# Patient Record
Sex: FEMALE | Race: WHITE | NOT HISPANIC OR LATINO | URBAN - METROPOLITAN AREA
[De-identification: names, ages, dates, MRNs, and addresses within clinical notes are randomized per-mention and may not be internally consistent; named-entity substitution may affect disease eponyms.]

---

## 2018-05-22 ENCOUNTER — APPOINTMENT (OUTPATIENT)
Dept: MRI IMAGING | Facility: CLINIC | Age: 48
End: 2018-05-22
Attending: EMERGENCY MEDICINE
Payer: COMMERCIAL

## 2018-05-22 ENCOUNTER — HOSPITAL ENCOUNTER (EMERGENCY)
Facility: CLINIC | Age: 48
Discharge: HOME | End: 2018-05-22
Attending: EMERGENCY MEDICINE | Admitting: EMERGENCY MEDICINE
Payer: COMMERCIAL

## 2018-05-22 VITALS
RESPIRATION RATE: 18 BRPM | TEMPERATURE: 98.2 F | SYSTOLIC BLOOD PRESSURE: 119 MMHG | DIASTOLIC BLOOD PRESSURE: 74 MMHG | HEIGHT: 62 IN | BODY MASS INDEX: 29.44 KG/M2 | WEIGHT: 160 LBS | OXYGEN SATURATION: 99 %

## 2018-05-22 DIAGNOSIS — M54.16 LUMBAR RADICULOPATHY: ICD-10-CM

## 2018-05-22 LAB — RADIOLOGIST FLAGS: NORMAL

## 2018-05-22 PROCEDURE — 25000132 ZZH RX MED GY IP 250 OP 250 PS 637: Performed by: EMERGENCY MEDICINE

## 2018-05-22 PROCEDURE — 99284 EMERGENCY DEPT VISIT MOD MDM: CPT | Mod: 25 | Performed by: EMERGENCY MEDICINE

## 2018-05-22 PROCEDURE — 72148 MRI LUMBAR SPINE W/O DYE: CPT

## 2018-05-22 PROCEDURE — 99284 EMERGENCY DEPT VISIT MOD MDM: CPT | Mod: Z6 | Performed by: EMERGENCY MEDICINE

## 2018-05-22 RX ORDER — ACETAMINOPHEN 500 MG
1000 TABLET ORAL ONCE
Status: COMPLETED | OUTPATIENT
Start: 2018-05-22 | End: 2018-05-22

## 2018-05-22 RX ORDER — KETOROLAC TROMETHAMINE 10 MG/1
10 TABLET, FILM COATED ORAL ONCE
Status: COMPLETED | OUTPATIENT
Start: 2018-05-22 | End: 2018-05-22

## 2018-05-22 RX ORDER — METHYLPREDNISOLONE 4 MG
TABLET, DOSE PACK ORAL
Qty: 21 TABLET | Refills: 0 | Status: SHIPPED | OUTPATIENT
Start: 2018-05-22 | End: 2018-05-31

## 2018-05-22 RX ORDER — KETOROLAC TROMETHAMINE 10 MG/1
10 TABLET, FILM COATED ORAL EVERY 6 HOURS PRN
Qty: 20 TABLET | Refills: 0 | Status: SHIPPED | OUTPATIENT
Start: 2018-05-22 | End: 2018-10-31

## 2018-05-22 RX ADMIN — KETOROLAC TROMETHAMINE 10 MG: 10 TABLET, FILM COATED ORAL at 07:33

## 2018-05-22 RX ADMIN — ACETAMINOPHEN 1000 MG: 500 TABLET, FILM COATED ORAL at 10:41

## 2018-05-22 ASSESSMENT — ENCOUNTER SYMPTOMS
ABDOMINAL PAIN: 0
BRUISES/BLEEDS EASILY: 0
FEVER: 0
WEAKNESS: 0
BACK PAIN: 1
NUMBNESS: 0
DIFFICULTY URINATING: 0
SHORTNESS OF BREATH: 0

## 2018-05-22 NOTE — ED AVS SNAPSHOT
[facility], [place], Emergency Department    [address] [place] [address]    Phone:  [phone]                                       Chayo Chang   MRN: [id number]    Department:  [facility], Emergency Department   Date of Visit:  5/22/2018           After Visit Summary Signature Page     I have received my discharge instructions, and my questions have been answered. I have discussed any challenges I see with this plan with the nurse or doctor.    ..........................................................................................................................................  Patient/Patient Representative Signature      ..........................................................................................................................................  Patient Representative Print Name and Relationship to Patient    ..................................................               ................................................  Date                                            Time    ..........................................................................................................................................  Reviewed by Signature/Title    ...................................................              ..............................................  Date                                                            Time

## 2018-05-22 NOTE — DISCHARGE INSTRUCTIONS
Thank you for coming to the [facility] Emergency Department.     Please follow up with primary care this week to arrange further testing of adnexal mass.     For back pain, use medrol dose pack until the entire pack is completed. Follow the instructions on the packaging.   Use Tylenol or Toradol as needed for pain every 6 hours.     Return to the ER for:  New weakness in the left leg  Inability to pass urine, or new incontinence of urine or stool

## 2018-05-22 NOTE — ED PROVIDER NOTES
History     Chief Complaint   Patient presents with     Back Pain     Leg Pain     HPI  Chayo Chang is a 48-year-old female with a history of epilepsy and migraine headaches who presents with back pain. She had the onset of symptoms on Sunday, 2 days ago, but they were mild. It is associated with radiation into the top of the left leg, down to the left knee. Over the course of the day yesterday and last night, the pain has become more significant and moved up into the low back. It is located primarily in the left side, near the SI joint. She has a position of comfort with sitting, leaning forward. No numbness or tingling. No bowel or bladder symptoms. She attempted relief of symptoms with ibuprofen without improvement. She is not anticoagulated and has no history of cancer. No falls or other trauma.     This part of the document was transcribed by Cornel Dial for Aziza Severino MD.      Past Medical History:   Diagnosis Date     Seizures (H)        Past Surgical History:   Procedure Laterality Date     RELEASE DEQUERVAINS WRIST Left 8/11/2016    Procedure: RELEASE DEQUERVAINS WRIST;  Surgeon: Deisi Lewis MD;  Location: [facility] OR     SKIN INCISION      nodules removed from scalp in childhood      VASCULAR SURGERY       wisdom teeth       4 removed        FH:   No hx of ovarian malignancies    Social History   Substance Use Topics     Smoking status: Never Smoker     Smokeless tobacco: Never Used     Alcohol use 4.2 oz/week     7 Glasses of wine per week      Comment: occas       No current facility-administered medications for this encounter.      Current Outpatient Prescriptions   Medication     acetaminophen (TYLENOL) 325 MG tablet     carBAMazepine (TEGRETOL XR) 400 MG 12 hr tablet     ibuprofen (IBUPROFEN) 200 MG tablet     ketorolac (TORADOL) 10 MG tablet     methylPREDNISolone (MEDROL DOSEPAK) 4 MG tablet     PHENobarbital (LUMINAL) 30 MG tablet     propranolol (INDERAL LA) 80 MG capsule      "SUMAtriptan (IMITREX) 100 MG tablet     Past Medical History:   Diagnosis Date     Seizures (H)        Past Surgical History:   Procedure Laterality Date     RELEASE DEQUERVAINS WRIST Left 8/11/2016    Procedure: RELEASE DEQUERVAINS WRIST;  Surgeon: Deisi Lewis MD;  Location: [facility] OR     SKIN INCISION      nodules removed from scalp in childhood      VASCULAR SURGERY       wisdom teeth       4 removed        No family history on file.    Social History   Substance Use Topics     Smoking status: Never Smoker     Smokeless tobacco: Never Used     Alcohol use 4.2 oz/week     7 Glasses of wine per week      Comment: occas     Allergies   Allergen Reactions     Ampicillin      Codeine          I have reviewed the Medications, Allergies, Past Medical and Surgical History, and Social History in the Epic system.    Review of Systems   Constitutional: Negative for fever.   Respiratory: Negative for shortness of breath.    Cardiovascular: Negative for chest pain.   Gastrointestinal: Negative for abdominal pain.   Genitourinary: Negative for difficulty urinating.   Musculoskeletal: Positive for back pain.   Neurological: Negative for weakness and numbness.   Hematological: Does not bruise/bleed easily.   All other systems reviewed and are negative.      Physical Exam   BP: 127/74 (Simultaneous filing. User may not have seen previous data.)  Heart Rate: 61  Temp: 98.2  F (36.8  C)  Resp: 18  Height: 157.5 cm (5' 2\")  Weight: 72.6 kg (160 lb)  SpO2: 97 %      Physical Exam  Gen:A&Ox3, no acute distress  HEENT: head atraumatic  Neck:no bony tenderness or step offs, no JVD, trachea midline  Back: no CVA tenderness, no midline bony tenderness, left SI and gluteal tenderness without skin changes.   CV:RRR without murmurs  PULM:Clear to auscultation bilaterally  Abd:soft, nontender, nondistended. Bowel sounds present and normal  UE:No traumatic injuries, skin normal  LE:no traumatic injuries, skin normal, no LE " edema  Neuro:CN II-XII intact, strength 5/5 of flexion and extension of the toes, ankles, knees, hips, hands, wrists, elbows and shoulders.  Sensation intact to touch throughout. Coordination normal on finger to nose testing. Gait limping on left.   Skin: no rashes or ecchymoses        ED Course     ED Course     Procedures          Critical Care time:  none    Assessments & Plan (with Medical Decision Making)   48-year-old female presenting with low back pain. Located primarily in the lateral SI area. Differential includes lumbar radiculopathy, piriformis syndrome, herniated disc. Treated with oral Toradol for pain, warm packs.    This part of the document was transcribed by Cornel Dial for Aziza Severino MD.    Patient was reassessed and did have improvement in pain with Toradol and was able to sleep 45 minutes.  She tolerated MRI without difficulty.  MRI was performed and shows an L3 left disc disc bulge    Patient was reassessed and had improvement in pain Toradol.  She was able to sleep 45 minutes.  She tolerated MRI without difficulty.  MRI was performed and shows a left L3 extraforaminal disc bulge consistent with her area of radicular symptoms.  May plan for her to try a Medrol Dosepak as well as Toradol as needed for pain.  Will have a follow-up with primary care for arranging outpatient steroid injection if not improving.    MRI also incidentally noted a left adnexal septated mass.  She is premenopausal.  Recommended follow-up is ultrasound.  The patient verbalized understanding of this finding and will contact her primary care doctor to arrange outpatient ultrasound.  This is currently asymptomatic.    I have reviewed the nursing notes.    I have reviewed the findings, diagnosis, plan and need for follow up with the patient.    New Prescriptions    KETOROLAC (TORADOL) 10 MG TABLET    Take 1 tablet (10 mg) by mouth every 6 hours as needed for moderate pain    METHYLPREDNISOLONE (MEDROL DOSEPAK) 4 MG TABLET     Follow package instructions       Final diagnoses:   Lumbar radiculopathy       5/22/2018   [facility], [place], EMERGENCY DEPARTMENT    MD NOEMÍ Sloan Katrina Anne, MD  05/22/18 2613

## 2018-05-22 NOTE — ED AVS SNAPSHOT
[facility], Emergency Department    [address] [facility] [address]    Phone:  [phone]                                       Chayo Chang   MRN: [id number]    Department:  [facility], Emergency Department   Date of Visit:  5/22/2018           Patient Information     Date Of Birth          1970        Your diagnoses for this visit were:     Lumbar radiculopathy        You were seen by Aziza Severino MD.        Discharge Instructions       Thank you for coming to the [facility] Emergency Department.     Please follow up with primary care this week to arrange further testing of adnexal mass.     For back pain, use medrol dose pack until the entire pack is completed. Follow the instructions on the packaging.   Use Tylenol or Toradol as needed for pain every 6 hours.     Return to the ER for:  New weakness in the left leg  Inability to pass urine, or new incontinence of urine or stool    24 Hour Appointment Hotline       To make an appointment at any [facility] clinic, call [phone] ([phone]). If you don't have a family doctor or clinic, we will help you find one. [facility] clinics are conveniently located to serve the needs of you and your family.             Review of your medicines      START taking        Dose / Directions Last dose taken    ketorolac 10 MG tablet   Commonly known as:  TORADOL   Dose:  10 mg   Quantity:  20 tablet        Take 1 tablet (10 mg) by mouth every 6 hours as needed for moderate pain   Refills:  0        methylPREDNISolone 4 MG tablet   Commonly known as:  MEDROL DOSEPAK   Quantity:  21 tablet        Follow package instructions   Refills:  0          Our records show that you are taking the medicines listed below. If these are incorrect, please call your family doctor or clinic.        Dose / Directions Last dose taken    ibuprofen 200 MG tablet   Commonly known as:  ibuprofen   Dose:  200 mg   Quantity:  100 tablet         Take 1 tablet (200 mg) by mouth every 4 hours as needed   Refills:  0        IMITREX 100 MG tablet   Generic drug:  SUMAtriptan        Take by mouth at onset of headache   Refills:  0        INDERAL LA 80 MG 24 hr capsule   Generic drug:  propranolol        Take by mouth At Bedtime   Refills:  0        PHENobarbital 30 MG tablet   Commonly known as:  LUMINAL   Dose:  30 mg        Take 30 mg by mouth 2 times daily   Refills:  0        TEGRETOL  MG 12 hr tablet   Dose:  400 mg   Generic drug:  carBAMazepine        Take 400 mg by mouth 2 times daily   Refills:  0        TYLENOL 325 MG tablet   Dose:  325-650 mg   Generic drug:  acetaminophen        Take 325-650 mg by mouth every 6 hours as needed   Refills:  0                Prescriptions were sent or printed at these locations (2 Prescriptions)                   Other Prescriptions                Printed at Department/Unit printer (2 of 2)         ketorolac (TORADOL) 10 MG tablet               methylPREDNISolone (MEDROL DOSEPAK) 4 MG tablet                Procedures and tests performed during your visit     Lumbar spine MRI w/o contrast      Orders Needing Specimen Collection     None      Pending Results     No orders found from 5/20/2018 to 5/23/2018.            Pending Culture Results     No orders found from 5/20/2018 to 5/23/2018.            Pending Results Instructions     If you had any lab results that were not finalized at the time of your Discharge, you can call the ED Lab Result RN at [phone]. You will be contacted by this team for any positive Lab results or changes in treatment. The nurses are available 7 days a week from 10A to 6:30P.  You can leave a message 24 hours per day and they will return your call.        Thank you for choosing [facility]       Thank you for choosing [facility] for your care. Our goal is always to provide you with excellent care. Hearing back from our patients is one way we can continue to improve our services. Please  take a few minutes to complete the written survey that you may receive in the mail after you visit with us. Thank you!        Care EveryWhere ID     This is your Care EveryWhere ID. This could be used by other organizations to access your [facility] medical records  [id number]        Equal Access to Services     LARRY RUFFIN : Moo Gibson, andres gurrola, pushpa mcdanielsaltariq barclay, loyd aguilar. So [facility] [phone].    ATENCIÓN: Si habla español, tiene a shoemaker disposición servicios gratuitos de asistencia lingüística. Llame al [phone].    We comply with applicable federal civil rights laws and [place] laws. We do not discriminate on the basis of race, color, national origin, age, disability, sex, sexual orientation, or gender identity.            After Visit Summary       This is your record. Keep this with you and show to your community pharmacist(s) and doctor(s) at your next visit.

## 2018-05-22 NOTE — ED TRIAGE NOTES
Pt ambulatory to triage c/o of L back and L leg pain. Pain started 2 days ago around 1800 and has progressively gotten worse. She has been taking ibuprofen q6hr for the pain. Other than frequent house cleaning, pt has not done any unusual activities.

## 2018-05-24 ENCOUNTER — TELEPHONE (OUTPATIENT)
Dept: ANESTHESIOLOGY | Facility: CLINIC | Age: 48
End: 2018-05-24

## 2018-05-24 ENCOUNTER — RADIANT APPOINTMENT (OUTPATIENT)
Dept: RADIOLOGY | Facility: AMBULATORY SURGERY CENTER | Age: 48
End: 2018-05-24
Payer: COMMERCIAL

## 2018-05-24 ENCOUNTER — HOSPITAL ENCOUNTER (OUTPATIENT)
Facility: AMBULATORY SURGERY CENTER | Age: 48
End: 2018-05-24
Payer: COMMERCIAL

## 2018-05-24 ENCOUNTER — SURGERY (OUTPATIENT)
Age: 48
End: 2018-05-24

## 2018-05-24 VITALS
DIASTOLIC BLOOD PRESSURE: 70 MMHG | TEMPERATURE: 98.3 F | HEIGHT: 62 IN | SYSTOLIC BLOOD PRESSURE: 133 MMHG | BODY MASS INDEX: 29.44 KG/M2 | WEIGHT: 160 LBS | HEART RATE: 66 BPM | RESPIRATION RATE: 15 BRPM | OXYGEN SATURATION: 96 %

## 2018-05-24 DIAGNOSIS — R52 PAIN: ICD-10-CM

## 2018-05-24 DIAGNOSIS — M54.16 LUMBAR RADICULOPATHY: Primary | ICD-10-CM

## 2018-05-24 RX ORDER — LIDOCAINE HYDROCHLORIDE 10 MG/ML
INJECTION, SOLUTION EPIDURAL; INFILTRATION; INTRACAUDAL; PERINEURAL PRN
Status: DISCONTINUED | OUTPATIENT
Start: 2018-05-24 | End: 2018-05-24 | Stop reason: HOSPADM

## 2018-05-24 RX ORDER — METHYLPREDNISOLONE ACETATE 40 MG/ML
INJECTION, SUSPENSION INTRA-ARTICULAR; INTRALESIONAL; INTRAMUSCULAR; SOFT TISSUE PRN
Status: DISCONTINUED | OUTPATIENT
Start: 2018-05-24 | End: 2018-05-24 | Stop reason: HOSPADM

## 2018-05-24 RX ORDER — IOPAMIDOL 408 MG/ML
INJECTION, SOLUTION INTRATHECAL PRN
Status: DISCONTINUED | OUTPATIENT
Start: 2018-05-24 | End: 2018-05-24 | Stop reason: HOSPADM

## 2018-05-24 RX ADMIN — METHYLPREDNISOLONE ACETATE 60 MG: 40 INJECTION, SUSPENSION INTRA-ARTICULAR; INTRALESIONAL; INTRAMUSCULAR; SOFT TISSUE at 13:24

## 2018-05-24 RX ADMIN — IOPAMIDOL 2 ML: 408 INJECTION, SOLUTION INTRATHECAL at 13:24

## 2018-05-24 RX ADMIN — LIDOCAINE HYDROCHLORIDE 15 ML: 10 INJECTION, SOLUTION EPIDURAL; INFILTRATION; INTRACAUDAL; PERINEURAL at 13:23

## 2018-05-24 NOTE — PROCEDURES
Transforaminal Epidural Steroid Injection    The patient s identity, the procedure to be performed and the specific site of the procedure was verified in accordance with [facility] Protocol.     Diagnosis:Lumbar Radiculopathy   Pre-procedure Pain Score: 8/10     Procedure Note:    Informed consent was obtained.  The patient was positioned comfortably in the Prone position and was prepped and draped in a sterile fashion.  There was no evidence of infection at the site of needle insertion.  The skin was anesthetized with 1% lidocaine and a spinal needle was then placed in the proximity of the neuroforamen under fluoroscopic guidance.  CSF was not aspirated, blood was not aspirated.  Placement was then confirmed at each level in two planes of reference with radio-opaque contrast.  The patient tolerated the procedure without complications and was observed for 30 minutes post-injection.      The patient was given discharge instructions and verbalizes understanding, including understanding of those signs and symptoms that would require emergency care.     Level(s):L3  Laterality: left     Needle Type:   22 g 5 inch spinal       Medication:  60mg depomedrol  0ml Normal Saline,   1.5ml  1%  Lidocaine    Post Procedure Pain Score: 2/10    Counseling: Greater than 50% of this patient visit was spent in counseling the patient regarding the treatment of their pain, coordinating their overall treatment plan and assessing their progress.

## 2018-05-24 NOTE — DISCHARGE INSTRUCTIONS
Home Care Instructions after an Epidural Steroid Pain Injection    A lumbar epidural steroid injection delivers steroid medication directly into the area that may be causing your lower back pain and/or leg pain. A cervical or thoracic epidural steroid injection delivers steroids into the epidural space surrounding spinal nerve roots to help relieve pain in the upper spine/neck.    Activity  -Rest today  -Do not work today  -Resume normal activity tomorrow  -DO NOT shower for 24 hours  -DO NOT remove bandaid for 24 hours    Pain  -You may experience soreness at the injection site for one or two days  -You may use an ice pack for 20 minutes every 2 hours for the first 24 hours  -You may use a heating pad after the first 24 hours  -You may use Tylenol (acetaminophen) every 4 hours or other pain medicines as     directed by your physician    You may experience numbness radiating into your legs or arms (depending on the procedure location). This numbness may last several hours. Until sensation returns to normal; please use caution in walking, climbing stairs, and stepping out of your vehicle, etc.    DID YOU RECEIVE SEDATION TODAY?  No    Safety  Sedation medicine, if given, may remain active for many hours. It is important for the next 24 hours that you do not:  -Drive a car  -Operate machines or power tools  -Consume alcohol, including beer  -Sign any important papers or legal documents      Common side effects of steroids:  Not everyone will experience corticosteroid side effects. If side effects are experienced, they will gradually subside in the 7-10 day period following an injection. Most common side effects include:  -Flushed face and/or chest  -Feeling of warmth, particularly in the face but could be an overall feeling of warmth  -Increased blood sugar in diabetic patients  -Menstrual irregularities my occur. If taking hormone-based birth control an alternate method of birth control is recommended  -Sleep  disturbances and/or mood swings are possible  -Leg cramps    Please contact us if you have:  -Severe pain  -Fever more than 101.5 degrees Fahrenheit  -Signs of infection at the injection site (redness, swelling, or drainage)    If you have questions, please contact our office at [phone] between the hours of 7:00 am and 3:00 pm Monday through Friday. After office hours you can contact the on call provider by dialing [phone]. If you need immediate attention, we recommend that you go to a hospital emergency room or dial [phone].

## 2018-05-24 NOTE — IP AVS SNAPSHOT
[facility]    [address] [place] [address]    Phone:  [phone]    Fax:  [phone]                                       After Visit Summary   5/24/2018    Chayo Chang    MRN: [id number]           After Visit Summary Signature Page     I have received my discharge instructions, and my questions have been answered. I have discussed any challenges I see with this plan with the nurse or doctor.    ..........................................................................................................................................  Patient/Patient Representative Signature      ..........................................................................................................................................  Patient Representative Print Name and Relationship to Patient    ..................................................               ................................................  Date                                            Time    ..........................................................................................................................................  Reviewed by Signature/Title    ...................................................              ..............................................  Date                                                            Time

## 2018-05-24 NOTE — IP AVS SNAPSHOT
MRN:[id number]                      After Visit Summary   5/24/2018    Chayo Chang    MRN: [id number]           Thank you!     Thank you for choosing [facility] for your care. Our goal is always to provide you with excellent care. Hearing back from our patients is one way we can continue to improve our services. Please take a few minutes to complete the written survey that you may receive in the mail after you visit with us. Thank you!        Patient Information     Date Of Birth          1970        About your hospital stay     You were admitted on:  May 24, 2018 You last received care in the:  [facility]    You were discharged on:  May 24, 2018       Who to Call     For medical emergencies, please call 911.  For non-urgent questions about your medical care, please call your primary care provider or clinic, [phone]  For questions related to your surgery, please call your surgery clinic        Attending Provider     Provider Specialty    Joon Watkins MD Anesthesiology       Primary Care Provider Office Phone # Fax #    Talita Quevedo [phone]      Your next 10 appointments already scheduled     May 24, 2018   Procedure with Joon Watkins MD   [facility] ([facility])    [address] [place] [address]  5th [facility] [address] [phone]           Located in the Clinics and Surgery Center at [address] [place] [address].   parking is very convenient and highly recommended.  is a $6 flat rate fee.  Both  and self parkers should enter the main arrival plaza from [facility]; parking attendants will direct you based on your parking preference.              Further instructions from your care team       Home Care Instructions after an Epidural Steroid Pain Injection    A lumbar epidural steroid injection delivers steroid medication directly into the  area that may be causing your lower back pain and/or leg pain. A cervical or thoracic epidural steroid injection delivers steroids into the epidural space surrounding spinal nerve roots to help relieve pain in the upper spine/neck.    Activity  -Rest today  -Do not work today  -Resume normal activity tomorrow  -DO NOT shower for 24 hours  -DO NOT remove bandaid for 24 hours    Pain  -You may experience soreness at the injection site for one or two days  -You may use an ice pack for 20 minutes every 2 hours for the first 24 hours  -You may use a heating pad after the first 24 hours  -You may use Tylenol (acetaminophen) every 4 hours or other pain medicines as     directed by your physician    You may experience numbness radiating into your legs or arms (depending on the procedure location). This numbness may last several hours. Until sensation returns to normal; please use caution in walking, climbing stairs, and stepping out of your vehicle, etc.    DID YOU RECEIVE SEDATION TODAY?  No    Safety  Sedation medicine, if given, may remain active for many hours. It is important for the next 24 hours that you do not:  -Drive a car  -Operate machines or power tools  -Consume alcohol, including beer  -Sign any important papers or legal documents      Common side effects of steroids:  Not everyone will experience corticosteroid side effects. If side effects are experienced, they will gradually subside in the 7-10 day period following an injection. Most common side effects include:  -Flushed face and/or chest  -Feeling of warmth, particularly in the face but could be an overall feeling of warmth  -Increased blood sugar in diabetic patients  -Menstrual irregularities my occur. If taking hormone-based birth control an alternate method of birth control is recommended  -Sleep disturbances and/or mood swings are possible  -Leg cramps    Please contact us if you have:  -Severe pain  -Fever more than 101.5 degrees Fahrenheit  -Signs  "of infection at the injection site (redness, swelling, or drainage)    If you have questions, please contact our office at [phone] between the hours of 7:00 am and 3:00 pm Monday through Friday. After office hours you can contact the on call provider by dialing [phone]. If you need immediate attention, we recommend that you go to a hospital emergency room or dial 911.        Pending Results     No orders found from 5/22/2018 to 5/25/2018.            Admission Information     Date & Time Provider Department Dept. Phone    5/24/2018 Joon Watkins MD [facility] [phone]      Your Vitals Were     Blood Pressure Pulse Temperature Respirations Height Weight    127/72 60 97.7  F (36.5  C) (Temporal) 16 1.575 m (5' 2\") 72.6 kg (160 lb)    Last Period Pulse Oximetry BMI (Body Mass Index)             04/24/2018 97% 29.26 kg/m2         Care EveryWhere ID     This is your Care EveryWhere ID. This could be used by other organizations to access your [facility] medical records  [id number]        Equal Access to Services     LARRY RUFFIN AH: Hadkiesha Gibson, andres gurrola, qajackie barclay, loyd aguilar. So [facility] [place] [facility] [phone].    ATENCIÓN: Si habla español, tiene a shoemaker disposición servicios gratuitos de asistencia lingüística. Flora al [phone].    We comply with applicable federal civil rights laws and [place] laws. We do not discriminate on the basis of race, color, national origin, age, disability, sex, sexual orientation, or gender identity.               Review of your medicines      UNREVIEWED medicines. Ask your doctor about these medicines        Dose / Directions    ibuprofen 200 MG tablet   Commonly known as:  ibuprofen        Dose:  200 mg   Take 1 tablet (200 mg) by mouth every 4 hours as needed   Quantity:  100 tablet   Refills:  0       IMITREX 100 MG tablet   Generic drug:  SUMAtriptan        Take by mouth at onset " of headache   Refills:  0       INDERAL LA 80 MG 24 hr capsule   Generic drug:  propranolol        Take by mouth At Bedtime   Refills:  0       ketorolac 10 MG tablet   Commonly known as:  TORADOL        Dose:  10 mg   Take 1 tablet (10 mg) by mouth every 6 hours as needed for moderate pain   Quantity:  20 tablet   Refills:  0       methylPREDNISolone 4 MG tablet   Commonly known as:  MEDROL DOSEPAK        Follow package instructions   Quantity:  21 tablet   Refills:  0       PHENobarbital 30 MG tablet   Commonly known as:  LUMINAL        Dose:  30 mg   Take 30 mg by mouth 2 times daily   Refills:  0       TEGRETOL  MG 12 hr tablet   Generic drug:  carBAMazepine        Dose:  400 mg   Take 400 mg by mouth 2 times daily   Refills:  0       TYLENOL 325 MG tablet   Generic drug:  acetaminophen        Dose:  325-650 mg   Take 325-650 mg by mouth every 6 hours as needed   Refills:  0                Protect others around you: Learn how to safely use, store and throw away your medicines at www.disposemymeds.org.             Medication List: This is a list of all your medications and when to take them. Check marks below indicate your daily home schedule. Keep this list as a reference.      Medications           Morning Afternoon Evening Bedtime As Needed    ibuprofen 200 MG tablet   Commonly known as:  ibuprofen   Take 1 tablet (200 mg) by mouth every 4 hours as needed                                IMITREX 100 MG tablet   Take by mouth at onset of headache   Generic drug:  SUMAtriptan                                INDERAL LA 80 MG 24 hr capsule   Take by mouth At Bedtime   Generic drug:  propranolol                                ketorolac 10 MG tablet   Commonly known as:  TORADOL   Take 1 tablet (10 mg) by mouth every 6 hours as needed for moderate pain                                methylPREDNISolone 4 MG tablet   Commonly known as:  MEDROL DOSEPAK   Follow package instructions                                 PHENobarbital 30 MG tablet   Commonly known as:  LUMINAL   Take 30 mg by mouth 2 times daily                                TEGRETOL  MG 12 hr tablet   Take 400 mg by mouth 2 times daily   Generic drug:  carBAMazepine                                TYLENOL 325 MG tablet   Take 325-650 mg by mouth every 6 hours as needed   Generic drug:  acetaminophen

## 2018-05-24 NOTE — TELEPHONE ENCOUNTER
Dr. Watkins requested that LPN assist with scheduling pt for a procedure today.     LPN read through the instructions with pt for the recommended procedure: Left Sided Lumbar Transforaminal Epidural Steroid Injection.   Pt verbalized understanding to holding appropriate medication per protocol, and was agreeable to NPO policy and needing a .    Dr. Watkins was made aware the pt has been taking Toradol, and was agreeable to doing the procedure today.    Order placed.     LPN emailed PA team, pt was approved.     LPN called Control desk to schedule the pt, as well as notified the Nurses in the Procedure area.     Hiral Phillips LPN

## 2018-05-31 ENCOUNTER — HOSPITAL ENCOUNTER (OUTPATIENT)
Facility: AMBULATORY SURGERY CENTER | Age: 48
End: 2018-05-31
Payer: COMMERCIAL

## 2018-05-31 ENCOUNTER — SURGERY (OUTPATIENT)
Age: 48
End: 2018-05-31

## 2018-05-31 ENCOUNTER — RADIANT APPOINTMENT (OUTPATIENT)
Dept: RADIOLOGY | Facility: AMBULATORY SURGERY CENTER | Age: 48
End: 2018-05-31
Payer: COMMERCIAL

## 2018-05-31 VITALS
SYSTOLIC BLOOD PRESSURE: 119 MMHG | HEIGHT: 61 IN | WEIGHT: 160 LBS | BODY MASS INDEX: 30.21 KG/M2 | DIASTOLIC BLOOD PRESSURE: 62 MMHG | RESPIRATION RATE: 16 BRPM | TEMPERATURE: 97.6 F | HEART RATE: 66 BPM | OXYGEN SATURATION: 100 %

## 2018-05-31 DIAGNOSIS — M54.16 LUMBAR RADICULOPATHY: Primary | ICD-10-CM

## 2018-05-31 DIAGNOSIS — M54.16 LUMBAR RADICULOPATHY: ICD-10-CM

## 2018-05-31 LAB
HCG UR QL: NEGATIVE
INTERNAL QC OK POCT: YES

## 2018-05-31 RX ORDER — BUPIVACAINE HYDROCHLORIDE 2.5 MG/ML
INJECTION, SOLUTION EPIDURAL; INFILTRATION; INTRACAUDAL PRN
Status: DISCONTINUED | OUTPATIENT
Start: 2018-05-31 | End: 2018-05-31 | Stop reason: HOSPADM

## 2018-05-31 RX ORDER — METHYLPREDNISOLONE ACETATE 40 MG/ML
INJECTION, SUSPENSION INTRA-ARTICULAR; INTRALESIONAL; INTRAMUSCULAR; SOFT TISSUE PRN
Status: DISCONTINUED | OUTPATIENT
Start: 2018-05-31 | End: 2018-05-31 | Stop reason: HOSPADM

## 2018-05-31 RX ORDER — LIDOCAINE HYDROCHLORIDE 10 MG/ML
INJECTION, SOLUTION EPIDURAL; INFILTRATION; INTRACAUDAL; PERINEURAL PRN
Status: DISCONTINUED | OUTPATIENT
Start: 2018-05-31 | End: 2018-05-31 | Stop reason: HOSPADM

## 2018-05-31 RX ORDER — IOPAMIDOL 408 MG/ML
INJECTION, SOLUTION INTRATHECAL PRN
Status: DISCONTINUED | OUTPATIENT
Start: 2018-05-31 | End: 2018-05-31 | Stop reason: HOSPADM

## 2018-05-31 RX ADMIN — BUPIVACAINE HYDROCHLORIDE 1 ML: 2.5 INJECTION, SOLUTION EPIDURAL; INFILTRATION; INTRACAUDAL at 10:30

## 2018-05-31 RX ADMIN — LIDOCAINE HYDROCHLORIDE 5 ML: 10 INJECTION, SOLUTION EPIDURAL; INFILTRATION; INTRACAUDAL; PERINEURAL at 10:31

## 2018-05-31 RX ADMIN — METHYLPREDNISOLONE ACETATE 40 MG: 40 INJECTION, SUSPENSION INTRA-ARTICULAR; INTRALESIONAL; INTRAMUSCULAR; SOFT TISSUE at 10:32

## 2018-05-31 RX ADMIN — IOPAMIDOL 2 ML: 408 INJECTION, SOLUTION INTRATHECAL at 10:33

## 2018-05-31 NOTE — IP AVS SNAPSHOT
[facility]    [address] [place] [address]    Phone:  [phone]    Fax:  [phone]                                       After Visit Summary   5/31/2018    Chayo Chang    MRN: [id number]           After Visit Summary Signature Page     I have received my discharge instructions, and my questions have been answered. I have discussed any challenges I see with this plan with the nurse or doctor.    ..........................................................................................................................................  Patient/Patient Representative Signature      ..........................................................................................................................................  Patient Representative Print Name and Relationship to Patient    ..................................................               ................................................  Date                                            Time    ..........................................................................................................................................  Reviewed by Signature/Title    ...................................................              ..............................................  Date                                                            Time

## 2018-05-31 NOTE — IP AVS SNAPSHOT
MRN:[id number]                      After Visit Summary   5/31/2018    Chyao Chang    MRN: [id number]           Thank you!     Thank you for choosing [facility] for your care. Our goal is always to provide you with excellent care. Hearing back from our patients is one way we can continue to improve our services. Please take a few minutes to complete the written survey that you may receive in the mail after you visit with us. Thank you!        Patient Information     Date Of Birth          1970        About your hospital stay     You were admitted on:  May 31, 2018 [facility]    You were discharged on:  May 31, 2018       Who to Call     For medical emergencies, please call 911.  For non-urgent questions about your medical care, please call your primary care provider or clinic, [phone]  For questions related to your surgery, please call your surgery clinic        Attending Provider     Provider Specialty    Joon Watkins MD Anesthesiology       Primary Care Provider Office Phone # Fax #    Talita Quevedo [phone]      Further instructions from your care team       Home Care Instructions after an Epidural Steroid Pain Injection    A lumbar epidural steroid injection delivers steroid medication directly into the area that may be causing your lower back pain and/or leg pain. A cervical or thoracic epidural steroid injection delivers steroids into the epidural space surrounding spinal nerve roots to help relieve pain in the upper spine/neck.    Activity  -Rest today  -Do not work today  -Resume normal activity tomorrow  -DO NOT shower for 24 hours  -DO NOT remove bandaid for 24 hours    Pain  -You may experience soreness at the injection site for one or two days  -You may use an ice pack for 20 minutes every 2 hours for the first 24 hours  -You may use a heating pad after the first 24 hours  -You may use  Tylenol (acetaminophen) every 4 hours or other pain medicines as     directed by your physician    You may experience numbness radiating into your legs or arms (depending on the procedure location). This numbness may last several hours. Until sensation returns to normal; please use caution in walking, climbing stairs, and stepping out of your vehicle, etc.    DID YOU RECEIVE SEDATION TODAY?  No    Safety  Sedation medicine, if given, may remain active for many hours. It is important for the next 24 hours that you do not:  -Drive a car  -Operate machines or power tools  -Consume alcohol, including beer  -Sign any important papers or legal documents      Common side effects of steroids:  Not everyone will experience corticosteroid side effects. If side effects are experienced, they will gradually subside in the 7-10 day period following an injection. Most common side effects include:  -Flushed face and/or chest  -Feeling of warmth, particularly in the face but could be an overall feeling of warmth  -Increased blood sugar in diabetic patients  -Menstrual irregularities my occur. If taking hormone-based birth control an alternate method of birth control is recommended  -Sleep disturbances and/or mood swings are possible  -Leg cramps    Please contact us if you have:  -Severe pain  -Fever more than 101.5 degrees Fahrenheit  -Signs of infection at the injection site (redness, swelling, or drainage)    If you have questions, please contact our office at [phone] between the hours of 7:00 am and 3:00 pm Monday through Friday. After office hours you can contact the on call provider by dialing [phone]. If you need immediate attention, we recommend that you go to a hospital emergency room or dial [phone].      Pending Results     Date and Time Order Name Status Description    5/31/2018 1010 XR SURGERY FLOR FLUORO LESS THAN 5 MIN W STILLS In process             Admission Information     Date & Time Provider Department  "Dept. Phone    5/31/2018 Joon Watkins MD [facility] [phone]      Your Vitals Were     Blood Pressure Pulse Temperature Respirations Height Weight    114/69 66 97.6  F (36.4  C) (Temporal) 16 1.549 m (5' 1\") 72.6 kg (160 lb)    Last Period Pulse Oximetry BMI (Body Mass Index)             04/27/2018 97% 30.23 kg/m2         Care EveryWhere ID     This is your Care EveryWhere ID. This could be used by other organizations to access your [facility] medical records  [id number]        Equal Access to Services     LARRY RUFFIN : Moo Gibson, andres gurrola, pushpa barclay, loyd cha . So [facility] [phone].    ATENCIÓN: Si habla español, tiene a shoemaker disposición servicios gratuitos de asistencia lingüística. Llame al [phone].    We comply with applicable federal civil rights laws and [place] laws. We do not discriminate on the basis of race, color, national origin, age, disability, sex, sexual orientation, or gender identity.               Review of your medicines      UNREVIEWED medicines. Ask your doctor about these medicines        Dose / Directions    FLEXERIL PO        Dose:  5 mg   Take 5 mg by mouth 3 times daily as needed for muscle spasms   Refills:  0       GABAPENTIN PO        Dose:  100 mg   Take 100 mg by mouth At Bedtime   Refills:  0       ibuprofen 200 MG tablet   Commonly known as:  ibuprofen        Dose:  200 mg   Take 1 tablet (200 mg) by mouth every 4 hours as needed   Quantity:  100 tablet   Refills:  0       IMITREX 100 MG tablet   Generic drug:  SUMAtriptan        Take by mouth at onset of headache   Refills:  0       INDERAL LA 80 MG 24 hr capsule   Generic drug:  propranolol        Take by mouth At Bedtime   Refills:  0       ketorolac 10 MG tablet   Commonly known as:  TORADOL        Dose:  10 mg   Take 1 tablet (10 mg) by mouth every 6 hours as needed for moderate pain   Quantity:  20 tablet "   Refills:  0       PHENobarbital 30 MG tablet   Commonly known as:  LUMINAL        Dose:  30 mg   Take 30 mg by mouth 2 times daily   Refills:  0       TEGRETOL  MG 12 hr tablet   Generic drug:  carBAMazepine        Dose:  400 mg   Take 400 mg by mouth 2 times daily   Refills:  0       TYLENOL 325 MG tablet   Generic drug:  acetaminophen        Dose:  325-650 mg   Take 325-650 mg by mouth every 6 hours as needed   Refills:  0                Protect others around you: Learn how to safely use, store and throw away your medicines at www.disposemymeds.org.             Medication List: This is a list of all your medications and when to take them. Check marks below indicate your daily home schedule. Keep this list as a reference.      Medications           Morning Afternoon Evening Bedtime As Needed    FLEXERIL PO   Take 5 mg by mouth 3 times daily as needed for muscle spasms                                GABAPENTIN PO   Take 100 mg by mouth At Bedtime                                ibuprofen 200 MG tablet   Commonly known as:  ibuprofen   Take 1 tablet (200 mg) by mouth every 4 hours as needed                                IMITREX 100 MG tablet   Take by mouth at onset of headache   Generic drug:  SUMAtriptan                                INDERAL LA 80 MG 24 hr capsule   Take by mouth At Bedtime   Generic drug:  propranolol                                ketorolac 10 MG tablet   Commonly known as:  TORADOL   Take 1 tablet (10 mg) by mouth every 6 hours as needed for moderate pain                                PHENobarbital 30 MG tablet   Commonly known as:  LUMINAL   Take 30 mg by mouth 2 times daily                                TEGRETOL  MG 12 hr tablet   Take 400 mg by mouth 2 times daily   Generic drug:  carBAMazepine                                TYLENOL 325 MG tablet   Take 325-650 mg by mouth every 6 hours as needed   Generic drug:  acetaminophen

## 2018-05-31 NOTE — DISCHARGE INSTRUCTIONS
Home Care Instructions after an Epidural Steroid Pain Injection    A lumbar epidural steroid injection delivers steroid medication directly into the area that may be causing your lower back pain and/or leg pain. A cervical or thoracic epidural steroid injection delivers steroids into the epidural space surrounding spinal nerve roots to help relieve pain in the upper spine/neck.    Activity  -Rest today  -Do not work today  -Resume normal activity tomorrow  -DO NOT shower for 24 hours  -DO NOT remove bandaid for 24 hours    Pain  -You may experience soreness at the injection site for one or two days  -You may use an ice pack for 20 minutes every 2 hours for the first 24 hours  -You may use a heating pad after the first 24 hours  -You may use Tylenol (acetaminophen) every 4 hours or other pain medicines as     directed by your physician    You may experience numbness radiating into your legs or arms (depending on the procedure location). This numbness may last several hours. Until sensation returns to normal; please use caution in walking, climbing stairs, and stepping out of your vehicle, etc.    DID YOU RECEIVE SEDATION TODAY?  No    Safety  Sedation medicine, if given, may remain active for many hours. It is important for the next 24 hours that you do not:  -Drive a car  -Operate machines or power tools  -Consume alcohol, including beer  -Sign any important papers or legal documents      Common side effects of steroids:  Not everyone will experience corticosteroid side effects. If side effects are experienced, they will gradually subside in the 7-10 day period following an injection. Most common side effects include:  -Flushed face and/or chest  -Feeling of warmth, particularly in the face but could be an overall feeling of warmth  -Increased blood sugar in diabetic patients  -Menstrual irregularities my occur. If taking hormone-based birth control an alternate method of birth control is recommended  -Sleep  disturbances and/or mood swings are possible  -Leg cramps    Please contact us if you have:  -Severe pain  -Fever more than 101.5 degrees Fahrenheit  -Signs of infection at the injection site (redness, swelling, or drainage)    If you have questions, please contact our office at [phone] between the hours of 7:00 am and 3:00 pm Monday through Friday. After office hours you can contact the on call provider by dialing [phone]. If you need immediate attention, we recommend that you go to a hospital emergency room or dial [phone].

## 2018-06-03 NOTE — PROCEDURES
Interlaminar Epidural Steroid Injection        Diagnosis: Lumbar Radiculopathy          The patient s identity, the procedure to be performed and the specific site of the procedure was verified in accordance with [facility] Protocol.      Pre-procedure Pain Score: 6/10    Procedure Note:    Informed consent was obtained.  The patient was placed comfortably into a prone position and was then prepped and draped in a sterile fashion.  There was no evidence of infection at the site of needle insertion.  The skin was anesthetized with 1% lidocaine and a tuohy needle was advanced under fluoroscopic guidance into the epidural space using a loss of resistance technique.  CSF was not aspirated, blood was not aspirated, paresthesia was not noted.  Position was confirmed with radio-opaque contrast.  The patient tolerated the procedure without complications.  The patient was observed for 30 minutes and was then released with post-procedure instructions.    The patient was given discharge instructions and verbalizes understanding, including understanding of those signs and symptoms that would require emergency care.     Level:L3/4  Laterality: left     Needle Type:   20g touhkathy        Medication:  40mg Kenalog  3ml Normal Saline,   1ml 0.25% Bupivacaine        Post Procedure Pain Score: 0/10      Counseling: Greater than 50% of this patient visit was spent in counseling the patient regarding the treatment of their pain, coordinating their overall treatment plan and assessing their progress.

## 2018-07-16 DIAGNOSIS — N83.202 LEFT OVARIAN CYST: Primary | ICD-10-CM

## 2018-07-27 ENCOUNTER — RADIANT APPOINTMENT (OUTPATIENT)
Dept: ULTRASOUND IMAGING | Facility: CLINIC | Age: 48
End: 2018-07-27
Attending: OBSTETRICS & GYNECOLOGY
Payer: COMMERCIAL

## 2018-07-27 DIAGNOSIS — N83.202 LEFT OVARIAN CYST: ICD-10-CM

## 2018-09-18 NOTE — TELEPHONE ENCOUNTER
FUTURE VISIT INFORMATION      FUTURE VISIT INFORMATION:    Date: 9/19/18    Time: 8:40AM    Location: [facility]  REFERRAL INFORMATION:    Referring provider:  self     Referring providers clinic:  self    Reason for visit/diagnosis  consult thyroid nodules     RECORDS REQUESTED FROM:       Clinic name Comments Records Status Imaging Status   [facility]   7/26/18 NM Thyroid   7/25/18 US FNA    Care Everywhere PACS                                   RECORDS STATUS      9/18/18 2:53PM called to get images above pushed to [employer] PACS, a request was sent to [facility] for slides to be sent via [employer], slides will not be consulted by appointment - Amay     9/20/18 3:40PM lvm with [facility] pathology to status of request for slides sent on 9/18/18 - Amay    9/21/18 10:11AM surgical path called confirming outside path was sent directly to them and they need consult form, faxing form this morning - Amay

## 2018-09-19 ENCOUNTER — PRE VISIT (OUTPATIENT)
Dept: OTOLARYNGOLOGY | Facility: CLINIC | Age: 48
End: 2018-09-19

## 2018-09-19 ENCOUNTER — OFFICE VISIT (OUTPATIENT)
Dept: OTOLARYNGOLOGY | Facility: CLINIC | Age: 48
End: 2018-09-19
Payer: COMMERCIAL

## 2018-09-19 VITALS — HEIGHT: 61 IN | WEIGHT: 156.8 LBS | BODY MASS INDEX: 29.6 KG/M2

## 2018-09-19 DIAGNOSIS — E04.1 THYROID NODULE: Primary | ICD-10-CM

## 2018-09-19 ASSESSMENT — PAIN SCALES - GENERAL: PAINLEVEL: NO PAIN (0)

## 2018-09-19 NOTE — PATIENT INSTRUCTIONS
1.  You were seen in the ENT Clinic today by Dr. Simon.  If you have any questions or concerns after your appointment, please call [phone].  Press option #1 for scheduling related needs.  Press option #3 for Nurse advice.  2.  Plan is to have your images pushed and reviewed at the head and neck conference.     Lexie CARPENTERN, RN  [facility]   Head & Neck Surgery

## 2018-09-19 NOTE — PROGRESS NOTES
September 19, 2018        Dear Dr. Quevedo:    I had the pleasure of meeting Chayo Chang in consultation today at the [facility] at your request.     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     She recalls that she was told she had a nodule many years ago, but had forgotten about it.  At a recent meeting, a colleague felt her thyroid bed was a bit full.  She had a little trouble getting in to our clinics here, and because she lives adjacent to [facility], her internist arranged a thyroid U/S at that facility.  The US revealed a L side 5 cm isoechoic nodule comprising most of the left lobe as well as a 3 mm R lobe nodule, both benign in appearance.     She then underwent a FNA biopsy of the L side nodule about 3 months ago through [facility]. (6/25/18). FNA of the L side nodule showed a benign colloid nodule at low risk of malignancy ([place] risk 0-3%).   Thyroid scintigraphy revealed 40% uptake at 24 hours indicating a hyperfunctioning nodule.     She has been asymptomatic (denies vocal changes, compressive symptoms, weight changes, temperature intolerance, palpitations, hypertension). She did have some weight loss, but states that she felt that was intentional as it was done in conjunction with her .  She reports that initial labs with her PCP showed a TSH of <0.1 and a slightly elevated free T4.     She is currently being managed on a beta blocker for subclinical hyperthyroidism.   Otherwise, no autoimmune or endocrine history. Family history notable for mother with Graves disease.      MEDICATIONS:     Current Outpatient Prescriptions   Medication Sig Dispense Refill     acetaminophen (TYLENOL) 325 MG tablet Take 325-650 mg by mouth every 6 hours as needed       carBAMazepine (TEGRETOL  XR) 400 MG 12 hr tablet Take 400 mg by mouth 2 times daily       Cyclobenzaprine HCl (FLEXERIL PO) Take 5 mg by mouth 3 times daily as needed for muscle spasms       GABAPENTIN PO Take 100 mg by mouth At Bedtime       ibuprofen (IBUPROFEN) 200 MG tablet Take 1 tablet (200 mg) by mouth every 4 hours as needed 100 tablet 0     ketorolac (TORADOL) 10 MG tablet Take 1 tablet (10 mg) by mouth every 6 hours as needed for moderate pain 20 tablet 0     PHENobarbital (LUMINAL) 30 MG tablet Take 30 mg by mouth 2 times daily       propranolol (INDERAL LA) 80 MG capsule Take by mouth At Bedtime        SUMAtriptan (IMITREX) 100 MG tablet Take by mouth at onset of headache         ALLERGIES:    Allergies   Allergen Reactions     Ampicillin      Codeine        HABITS/SOCIAL HISTORY:  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 She lives in [place].  She is a non smoker and non-drinker.    PAST MEDICAL HISTORY:   Past Medical History:   Diagnosis Date     Migraines      Seizures (H)      Thyroid disease     reason for appt        FAMILY HISTORY:    Family History   Problem Relation Age of Onset     Asthma Other      Osteoporosis Other      Thyroid Disease Other    States that her mother has a history of Graves disease.     REVIEW OF SYSTEMS:    A 10 point Review of Systems was performed and pertinent positives are noted in the HPI; remaining positives are: right foot swelling for about 1 month.  Patient Supplied Answers to Review of Systems  [facility] ROS 9/19/2018   Musculoskeletal Back pain, Swollen legs/feet       PHYSICAL EXAMINATION:    Constitutional:  The patient was unaccompanied, pleasant, and in no acute distress.    Skin:  Warm and pink.    Neurologic:  Alert and oriented.  Voice normal.   Psychiatric:  The patient's affect was calm, cooperative, and appropriate.    Respiratory:  Breathing comfortably without stridor or exertion of accessory muscles.    Head:  Normocephalic and atraumatic.  No lesions or  scars.    Ears:  Pinnae and tragus non-tender.  EAC's clear, TMs obscured by cerumen.  Nose:  Sinuses were non-tender.  Anterior rhinoscopy revealed midline septum and absence of purulence or polyps.    OC/OP:  Normal tongue, floor of mouth, buccal mucosa, and palate.  No lesions or masses on inspection or palpation.  No abnormal lymph tissue in the oropharynx.  Negative Chvostek's.  Neck:  Supple with normal laryngeal and tracheal landmarks.  The parotid beds were without masses.  There is a left lobe thyroid fullness that is soft to palpation.  Ultrasound assisted examination confirms a very large, relatively homogenous mass replacing the left thyroid gland.    Lymphatic:  There is no palpable lymphadenopathy in the neck.       Fiberoptic Endoscopy:  Consent for fiberoptic laryngoscopy was obtained, and we confirmed correctness of procedure and identity of patient.  Fiberoptic laryngoscopy was indicated due to the thyroid disease..  The nose was topically decongested and anesthetized.  The fiberoptic laryngoscope was passed under endoscopic vision.  The turbinates were normal.  The inferior and middle meati were clear bilaterally without purulence, masses, or polyps.  The nasopharynx was clear.  The Eustachian tubes were clear.  The soft palate appeared normal with good mobility.  The epiglottis was sharp and the visualized portion of the vallecula was clear.  The larynx was clear with mobile cords.  The arytenoids were clear and there was no pooling in the hypopharynx.        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 She does have a family history of Grave's disease.  Molecular testing or repeat biopsy are not indicated at this time with a [place] 2 rating.     We agreed that she would benefit from seeing Ashley Brar, whom she knows and tried initially to see.  I think that pharmacologic control of  the hyperthyroidism is the best first step.  For now, I don't see a role for surgery unless she becomes symptomatic and recalcitrant to medical therapy.  I will await Dr Brar's opinion of course, and then we can talk again.    Thank you very much for the opportunity to participate in the care of your patient.      Almita Garnett M.D.  Otolaryngology/Head & Neck Surgery  [phone]      ADDENDUM:  I have separately spoken with and examined Ms. Chang personally.  Ms Garnett served as the scribe for the examination and assessment, but I have edited Ms Garnett's entire note to reflect my perspective on the findings, assessment, and plan..  I personally performed the endoscopy procedure (including scope insertion and withdrawal).      Otoniel Simon MD  Otolaryngology/Head & Neck Surgery  [phone]                    Alexander Willett MD  [address]  [place] [address]    Ashley Brar MD  [facility] Endocrinology  [address]  [place] [address]

## 2018-09-19 NOTE — LETTER
9/19/2018       RE: Chayo Chang  [address]  [place] [address]     Dear Colleague,    Thank you for referring your patient, Chayo Chang, to the [facility] EAR NOSE AND THROAT at [facility]. Please see a copy of my visit note below.    September 19, 2018        Dear Dr. Quevedo:    I had the pleasure of meeting Chayo Chang in consultation today at the [facility] Ot[facility] at your request.     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     She recalls that she was told she had a nodule many years ago, but had forgotten about it.  At a recent meeting, a colleague felt her thyroid bed was a bit full.  She had a little trouble getting in to our clinics here, and because she lives adjacent to [facility], her internist arranged a thyroid U/S at that facility.  The US revealed a L side 5 cm isoechoic nodule comprising most of the left lobe as well as a 3 mm R lobe nodule, both benign in appearance.     She then underwent a FNA biopsy of the L side nodule about 3 months ago through [facility]. (6/25/18). FNA of the L side nodule showed a benign colloid nodule at low risk of malignancy ([place] risk 0-3%).   Thyroid scintigraphy revealed 40% uptake at 24 hours indicating a hyperfunctioning nodule.     She has been asymptomatic (denies vocal changes, compressive symptoms, weight changes, temperature intolerance, palpitations, hypertension). She did have some weight loss, but states that she felt that was intentional as it was done in conjunction with her .  She reports that initial labs with her PCP showed a TSH of <0.1 and a slightly elevated free T4.     She is currently being managed on a beta blocker for subclinical hyperthyroidism.   Otherwise, no  autoimmune or endocrine history. Family history notable for mother with Graves disease.      MEDICATIONS:     Current Outpatient Prescriptions   Medication Sig Dispense Refill     acetaminophen (TYLENOL) 325 MG tablet Take 325-650 mg by mouth every 6 hours as needed       carBAMazepine (TEGRETOL XR) 400 MG 12 hr tablet Take 400 mg by mouth 2 times daily       Cyclobenzaprine HCl (FLEXERIL PO) Take 5 mg by mouth 3 times daily as needed for muscle spasms       GABAPENTIN PO Take 100 mg by mouth At Bedtime       ibuprofen (IBUPROFEN) 200 MG tablet Take 1 tablet (200 mg) by mouth every 4 hours as needed 100 tablet 0     ketorolac (TORADOL) 10 MG tablet Take 1 tablet (10 mg) by mouth every 6 hours as needed for moderate pain 20 tablet 0     PHENobarbital (LUMINAL) 30 MG tablet Take 30 mg by mouth 2 times daily       propranolol (INDERAL LA) 80 MG capsule Take by mouth At Bedtime        SUMAtriptan (IMITREX) 100 MG tablet Take by mouth at onset of headache         ALLERGIES:    Allergies   Allergen Reactions     Ampicillin      Codeine        HABITS/SOCIAL HISTORY:  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  She is a non smoker and non-drinker.    PAST MEDICAL HISTORY:   Past Medical History:   Diagnosis Date     Migraines      Seizures (H)      Thyroid disease     reason for appt        FAMILY HISTORY:    Family History   Problem Relation Age of Onset     Asthma Other      Osteoporosis Other      Thyroid Disease Other    States that her mother has a history of Graves disease.     REVIEW OF SYSTEMS:    A 10 point Review of Systems was performed and pertinent positives are noted in the HPI; remaining positives are: right foot swelling for about 1 month.  Patient Supplied Answers to Review of Systems  [facility] ROS 9/19/2018   Musculoskeletal Back pain, Swollen legs/feet       PHYSICAL EXAMINATION:    Constitutional:  The patient was unaccompanied, pleasant, and in no acute distress.     Skin:  Warm and pink.    Neurologic:  Alert and oriented.  Voice normal.   Psychiatric:  The patient's affect was calm, cooperative, and appropriate.    Respiratory:  Breathing comfortably without stridor or exertion of accessory muscles.    Head:  Normocephalic and atraumatic.  No lesions or scars.    Ears:  Pinnae and tragus non-tender.  EAC's clear, TMs obscured by cerumen.  Nose:  Sinuses were non-tender.  Anterior rhinoscopy revealed midline septum and absence of purulence or polyps.    OC/OP:  Normal tongue, floor of mouth, buccal mucosa, and palate.  No lesions or masses on inspection or palpation.  No abnormal lymph tissue in the oropharynx.  Negative Chvostek's.  Neck:  Supple with normal laryngeal and tracheal landmarks.  The parotid beds were without masses.  There is a left lobe thyroid fullness that is soft to palpation.  Ultrasound assisted examination confirms a very large, relatively homogenous mass replacing the left thyroid gland.    Lymphatic:  There is no palpable lymphadenopathy in the neck.       Fiberoptic Endoscopy:  Consent for fiberoptic laryngoscopy was obtained, and we confirmed correctness of procedure and identity of patient.  Fiberoptic laryngoscopy was indicated due to the thyroid disease..  The nose was topically decongested and anesthetized.  The fiberoptic laryngoscope was passed under endoscopic vision.  The turbinates were normal.  The inferior and middle meati were clear bilaterally without purulence, masses, or polyps.  The nasopharynx was clear.  The Eustachian tubes were clear.  The soft palate appeared normal with good mobility.  The epiglottis was sharp and the visualized portion of the vallecula was clear.  The larynx was clear with mobile cords.  The arytenoids were clear and there was no pooling in the hypopharynx.        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 She does have a family history of Grave's disease.  Molecular testing or repeat biopsy are not indicated at this time with a [place] 2 rating.     We agreed that she would benefit from seeing Ashley Brar, whom she knows and tried initially to see.  I think that pharmacologic control of the hyperthyroidism is the best first step.  For now, I don't see a role for surgery unless she becomes symptomatic and recalcitrant to medical therapy.  I will await Dr Brar's opinion of course, and then we can talk again.    Thank you very much for the opportunity to participate in the care of your patient.      Almita Garnett M.D.  Otolaryngology/Head & Neck Surgery  [phone]      ADDENDUM:  I have separately spoken with and examined Ms. Chang personally.  Ms Garnett served as the scribe for the examination and assessment, but I have edited Ms Garnett's entire note to reflect my perspective on the findings, assessment, and plan..  I personally performed the endoscopy procedure (including scope insertion and withdrawal).      Otoniel Simon MD  Otolaryngology/Head & Neck Surgery  [phone]      Alexander Willett MD  [facility]  [address]  [place] [address]    Ashley Brar MD  [facility] Endocrinology  [address]  [place] [address]

## 2018-09-19 NOTE — MR AVS SNAPSHOT
After Visit Summary   2018    Chayo Chang    MRN: [id number]           Patient Information     Date Of Birth          1970        Visit Information        Provider Department      2018 8:40 AM Otoniel Simon MD [facility] Ear Nose and Throat        Today's Diagnoses     Thyroid nodule    -  1      Care Instructions    1.  You were seen in the ENT Clinic today by Dr. Simon.  If you have any questions or concerns after your appointment, please call [phone].  Press option #1 for scheduling related needs.  Press option #3 for Nurse advice.  2.  Plan is to have your images pushed and reviewed at the head and neck conference.     Lexie PETERS, RN  [facility] ENT   Head & Neck Surgery               Follow-ups after your visit        Who to contact     Please call your clinic at [phone] to:    Ask questions about your health    Make or cancel appointments    Discuss your medicines    Learn about your test results    Speak to your doctor            Additional Information About Your Visit        MyChart Information     CleverMiles is an electronic gateway that provides easy, online access to your medical records. With CleverMiles, you can request a clinic appointment, read your test results, renew a prescription or communicate with your care team.     To sign up for [email or web address] visit the website at [email or web address]   You will be asked to enter the access code listed below, as well as some personal information. Please follow the directions to create your username and password.     Your access code is: [email or web address]  Expires: 12/10/2018  6:30 AM     Your access code will  in 90 days. If you need help or a new code, please contact your [facility] or call [phone] for assistance.        Care EveryWhere ID     This is your Care EveryWhere ID. This could be used by other organizations to access your [facility]  "records  [id number]        Your Vitals Were     Height BMI (Body Mass Index)                1.549 m (5' 1\") 29.63 kg/m2           Blood Pressure from Last 3 Encounters:   05/31/18 119/62   05/24/18 133/70   05/22/18 119/74    Weight from Last 3 Encounters:   09/19/18 71.1 kg (156 lb 12.8 oz)   05/31/18 72.6 kg (160 lb)   05/24/18 72.6 kg (160 lb)              We Performed the Following     IMAGESTREAM RECORDING ORDER     LARYNGOSCOPY FLEX FIBEROPTIC, DIAGNOSTIC        Primary Care Provider Office Phone # Fax #    Talita Quevedo [phone]       [address] [address]        Equal Access to Services     LARRY RUFFIN : Hadii aad ku hadasho Soomaali, waaxda luqadaha, qaybta kaalmada adeegyada, loyd cha . So [facility] [phone].    ATENCIÓN: Si habla español, tiene a shoemaker disposición servicios gratuitos de asistencia lingüística. LlRegency Hospital [facility] East [phone].    We comply with applicable federal civil rights laws and [place] laws. We do not discriminate on the basis of race, color, national origin, age, disability, sex, sexual orientation, or gender identity.            Thank you!     Thank you for choosing [facility] EAR NOSE AND THROAT  for your care. Our goal is always to provide you with excellent care. Hearing back from our patients is one way we can continue to improve our services. Please take a few minutes to complete the written survey that you may receive in the mail after your visit with us. Thank you!             Your Updated Medication List - Protect others around you: Learn how to safely use, store and throw away your medicines at www.disposemymeds.org.          This list is accurate as of 9/19/18 11:59 PM.  Always use your most recent med list.                   Brand Name Dispense Instructions for use Diagnosis    FLEXERIL PO      Take 5 mg by mouth 3 times daily as needed for muscle spasms        GABAPENTIN PO      Take 100 mg " by mouth At Bedtime        ibuprofen 200 MG tablet    ibuprofen    100 tablet    Take 1 tablet (200 mg) by mouth every 4 hours as needed        IMITREX 100 MG tablet   Generic drug:  SUMAtriptan      Take by mouth at onset of headache        INDERAL LA 80 MG 24 hr capsule   Generic drug:  propranolol      Take by mouth At Bedtime        ketorolac 10 MG tablet    TORADOL    20 tablet    Take 1 tablet (10 mg) by mouth every 6 hours as needed for moderate pain        PHENobarbital 30 MG tablet    LUMINAL     Take 30 mg by mouth 2 times daily        TEGRETOL  MG 12 hr tablet   Generic drug:  carBAMazepine      Take 400 mg by mouth 2 times daily        TYLENOL 325 MG tablet   Generic drug:  acetaminophen      Take 325-650 mg by mouth every 6 hours as needed

## 2018-09-20 ENCOUNTER — TELEPHONE (OUTPATIENT)
Dept: ENDOCRINOLOGY | Facility: CLINIC | Age: 48
End: 2018-09-20

## 2018-09-20 NOTE — TELEPHONE ENCOUNTER
----- Message from Ashley Brar MD sent at 9/20/2018  9:21 AM CDT -----  Regarding: schedule appt  Please see if she can see me on 9/24/18 sometime between 1 PM and 240 PM.  40 minute new patient appt.    Thanks    Ashley Brar

## 2018-09-21 ENCOUNTER — TEAM CONFERENCE (OUTPATIENT)
Dept: OTOLARYNGOLOGY | Facility: CLINIC | Age: 48
End: 2018-09-21

## 2018-09-21 NOTE — TELEPHONE ENCOUNTER
Pt called and a detailed voicemail was left.  Pt should follow up with Dr. Brar in endocrinology.      Lexie CARPENTERN, RN  [phone]  [facility] ENT   Head & Neck Surgery   9/21/2018 3:31 PM

## 2018-09-21 NOTE — TELEPHONE ENCOUNTER
Head & Neck Tumor Conference Note     Patient Name: Chayo Chang, 48 year old    Status: New    Staff: Dr. 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  The patient is asymptomatic but has a TSH of <0.1 and a slightly elevated free T4. The cytology is negative.       She is currently being managed on a beta blocker for subclinical hyperthyroidism.   Otherwise, no autoimmune or endocrine history. Family history notable for mother with Graves disease    Imaging:   Thyroid scintigraphy shows a large toxic nodule with no concerning malignant features.     Tumor Board Recommendation:    Referral to Dr. Brar..     oMlly Alvarez MD PGY-3  Otolaryngology-Head & Neck Surgery

## 2018-09-24 PROCEDURE — 00000346 ZZHCL STATISTIC REVIEW OUTSIDE SLIDES TC 88321: Performed by: OTOLARYNGOLOGY

## 2018-09-25 ENCOUNTER — TELEPHONE (OUTPATIENT)
Dept: ENDOCRINOLOGY | Facility: CLINIC | Age: 48
End: 2018-09-25

## 2018-09-27 LAB — COPATH REPORT: NORMAL

## 2018-10-31 ENCOUNTER — OFFICE VISIT (OUTPATIENT)
Dept: ENDOCRINOLOGY | Facility: CLINIC | Age: 48
End: 2018-10-31
Payer: COMMERCIAL

## 2018-10-31 VITALS
SYSTOLIC BLOOD PRESSURE: 139 MMHG | DIASTOLIC BLOOD PRESSURE: 85 MMHG | HEIGHT: 61 IN | BODY MASS INDEX: 29.72 KG/M2 | HEART RATE: 102 BPM | WEIGHT: 157.4 LBS | TEMPERATURE: 98.6 F

## 2018-10-31 DIAGNOSIS — E05.90 HYPERTHYROIDISM: ICD-10-CM

## 2018-10-31 DIAGNOSIS — E05.10 TOXIC ADENOMA: ICD-10-CM

## 2018-10-31 DIAGNOSIS — E05.10 TOXIC ADENOMA: Primary | ICD-10-CM

## 2018-10-31 DIAGNOSIS — R25.1 TREMOR: ICD-10-CM

## 2018-10-31 LAB
T3 SERPL-MCNC: 187 NG/DL (ref 60–181)
T4 FREE SERPL-MCNC: 1.93 NG/DL (ref 0.76–1.46)
TSH SERPL DL<=0.005 MIU/L-ACNC: <0.01 MU/L (ref 0.4–4)

## 2018-10-31 ASSESSMENT — PAIN SCALES - GENERAL: PAINLEVEL: NO PAIN (0)

## 2018-10-31 NOTE — LETTER
Patient:  Chayo Chang  :   1970  MRN:     [id number]        Chayo [facility]  [address]  [place] [address]        2018    Dear Chayo,    I believe that the last letter we sent you was sent before I completed it or signed it. I am writing today to state what I hadn't yet written into the last letter.    As of today I still do not have the original 18 US for review.  As we await this, I recommend we start methimazole with the goal of normalizing the Free T4 and T3 in anticipation of likely ultimate need for surgical treatment.  You should have repeat thyroid labs after you have been on the medication about one month.  I am attaching information on the rules of treatment below.      I am submitting the Rx to the [facility] pharmacy on record today.      If you would sign up for [employer] IMS the communication would be much easier.  If you want to talk you can text me at [phone] and I will call you back, assuming I am not with a patient or otherwise committed at the moment.       Sincerely,    Ashley Brar MD    Information for patients on Tapazole or Propylthiouracil (PTU)  The generic name for Tapazole is methimazole.      The drugs, Tapazole and PTU are used to treat an overactive thyroid (hyperthyroidism).      They prevent the thyroid from making too much thyroid hormone.  You can think of them as putting up a road block in your thyroid.    These drugs are usually well tolerated and safe, but rarely can cause serious side effects.  The two worst potential side-effects are:  1. agranulocytosis.  This is the loss of the white blood cells that fight infection.  This can occur in approximately 1 in 200 people.  2. liver problems.  This is even more rare than agranulocytosis but it can be very serious.    Because of the serious nature of the rare side-effects, you should notify your doctor if you develop the following symptoms while taking the dru. Fever  2. sore  throat  3. flu-like symptoms (nausea, vomiting, diarrhea, aches, abdominal pain)    In addition, anytime you have evidence of infection, you should get a blood test to be sure that you do not have agranulocytosis.  A prescription will be provided to you to get this blood test as needed.  This is an extra precaution and the results should be available the same day the blood test is drawn.  If your blood count is OK (which it almost always is), then you may continue to take the antithyroid drug.    While taking this medication, your doctor will probably want to see you frequently, every 1-2 months, at least for a time.  This is important so that the response can be monitored and the dose can be adjusted.      If you have concerns you may reach us at [phone] during regular hours, and [phone] (ask for endocrinologist on call) after hours.

## 2018-10-31 NOTE — NURSING NOTE
Chief Complaint   Patient presents with     Consult     THYROID NODULES     Breanna Christensen MA

## 2018-10-31 NOTE — PROGRESS NOTES
"Endocrine Consult note    Attending Assessment/Plan :     1.  Left thyroid nodule (5 cm) with high 123I uptake and complete suppression of right lobe. Differential is Toxic adenoma vs MNG (asymmetric) or Graves with absence of right lobe.   Right lobe is abnormally small but not absent on real time imaging.  We expect suppression of the contralateral lobe with such a finding as see on the thyroid scan, but the lobe is so small it also raises questions of left lobe being her only thyroid with congenitally absent right lobe  Because of this question we measured TSI , which was negative.     In the absence of Graves', the size of the left sided process is  sufficiently large that left thyroid lobectomy (and isthmusectomy since the mass projects into the isthmus) may be the best treatment option .  A radioactive iodine dose would have to be quite high and she would also probably continue to have the mass even if we can improve the function.   With 40% uptake , treatment sufficient to deliver 70580 rads to the center of the nodule exceeds 30 mCi (I am calculating 63 mCi minimum)  Https://www.ncbi.nlm.nih.gov/pubmed/160025  5 cm3 volume =62.5     62.5 g (86978 rads)* 6.7 / 5 day T1/2 (40% uptake) 1000=    FRANK for 6/25/18 US images for my review - I would like to see what the right lobe looks like there.  As of 11/7/18 this study has not yet been trasferred to our system for review.    I would be interested in remote thyroid US report relative to size of the right lobe then.     Addendum: 11/7/18 later in the day: 6/25/18 US has now been pushed to PACS  Once again I conclude the right lobe is diminutively small but not absent. There is a subcm anterior nodule on the right.   I do not see normal rim of thyroid around the left lobe nodule process    2.  Hyperthyroidism with high 123I uptake left only.  \"Right lobe not seen    3. Absence of uptake on the right lobe, very small size of right thyroid on US.  As above.    She " has had appropriate work up to date.     4.  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   I have reviewed Care Everywhere including [facility] lab reports, imaging reports and provider notes as indicated.      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 She believes she had normal TFTS,  US and FNAB at that time. The studies were performed in [place].  She had follow up for a time but eventually forgot about it.      Neck mass was recently noted by one of our colleagues.  She saw her PCP 6/12/18 who ordered TFTS and thyroid US.  The TFTS showed suppressed TSH with high Free T4.  T3 was not measured.  US confirmed a 5 cm left thyroid mass.  I do not have the outside images from this study .  She underwent US guided FNAB of this mass on 7/25/18 with benign cytology as re-read here ([id number]). She was seen by Obdulio Jules (Endocrinology, [facility]) 9/5/18.  At that time it was felt she was asymptomatic on propranolol.       We have the following lab data:   6/12/18: TSH < 0.01, free T4 1.97, TPO < 0.5, creatinine 0.65, Ca 9.7  7/25/18: TSH < 0.01, free T4 1.62  10/31/18 following appt, not Discussed at appt :TSH < 0.01, Free T4 1.93, T3 187, TSI < 1     images   6/22/18 : DXA ([facility]) - lowest T-score -2 right total hip; lowest Z-score -1.7 right total hip;   6/25/18 thyroid US ([facility] ) we do not have this on PACS- reportedly shows 5 cm solid left nodule and 0.3 cm right nodule.    7/25/18 thyroid US-images reviewed on PACs incomplete study - presumably FNAB procedure   7/26/18 123I thyroid uptake/scan:images reviewed on PACS left high uptake ; right not seen . 24 hour uptake 40%    Family history is positive for Graves' in her mother.     REVIEW OF SYSTEMS  A little swelling in feet at the end of the day  Not  exercising since L3-L4 disc bulge with a lot of pain   10 system ROS otherwise as per the HPI or negative    Past Medical History  Past Medical History:   Diagnosis Date     Hyperthyroidism 06/2018     Migraines      Seizures (H)      Thyroid nodule 06/2018    reason for appt     Past Surgical History:   Procedure Laterality Date     INJECT EPIDURAL LUMBAR / SACRAL SINGLE Left 5/31/2018    Procedure: INJECT EPIDURAL LUMBAR / SACRAL CONTINUAL OR INTERMITTENT;  Lumbar Epidural interlaminal epidural steroid injection left L3/4;  Surgeon: Joon Watkins MD;  Location: [facility] OR     INJECT EPIDURAL TRANSFORAMINAL LUMBAR / SACRAL SINGLE Left 5/24/2018    Procedure: INJECT EPIDURAL TRANSFORAMINAL LUMBAR / SACRAL SINGLE;  Left transforaminal lumbar epidural injection L3L4;  Surgeon: Joon Watkins MD;  Location: [facility] OR     RELEASE DEQUERVAINS WRIST Left 8/11/2016    Procedure: RELEASE DEQUERVAINS WRIST;  Surgeon: Deisi Lewis MD;  Location: [facility] OR     SKIN INCISION      nodules removed from scalp in childhood      VASCULAR SURGERY       wisdom teeth       4 removed        Medications    Current Outpatient Prescriptions   Medication Sig Dispense Refill     acetaminophen (TYLENOL) 325 MG tablet Take 325-650 mg by mouth every 6 hours as needed       carBAMazepine (TEGRETOL XR) 400 MG 12 hr tablet Take 400 mg by mouth 2 times daily       Cyclobenzaprine HCl (FLEXERIL PO) Take 5 mg by mouth 3 times daily as needed for muscle spasms       ibuprofen (IBUPROFEN) 200 MG tablet Take 1 tablet (200 mg) by mouth every 4 hours as needed 100 tablet 0     PHENobarbital (LUMINAL) 30 MG tablet Take 30 mg by mouth 2 times daily       propranolol (INDERAL LA) 80 MG capsule Take by mouth At Bedtime        SUMAtriptan (IMITREX) 100 MG tablet Take by mouth at onset of headache         Allergies  Allergies   Allergen Reactions     Ampicillin      Codeine      Family History  family history includes Asthma in an other family member;  "Hyperthyroidism in her mother; Osteoporosis in an other family member; Thyroid Disease in an other family member.    Social History  Social History   Substance Use Topics     Smoking status: Never Smoker     Smokeless tobacco: Never Used     Alcohol use 4.2 oz/week      Comment: occas     Physical Exam  /85  Pulse 102  Temp 98.6  F (37  C) (Oral)  Ht 1.54 m (5' 0.63\")  Wt 71.4 kg (157 lb 6.4 oz)  BMI 30.1 kg/m2  Body mass index is 30.1 kg/(m^2).  GENERAL : pleasant woman  In no apparent distress  SKIN: Normal color, normal temperature, texture.  No hirsutism, alopecia   EYES: PERRL, EOMI, No scleral icterus,  No proptosis, conjunctival redness, stare, retraction  MOUTH: Moist, pink; pharynx clear  NECK: Subtle visible fullness thyroid bed - No palpable adenopathy, or masses. No carotid bruits.   THYROID:  Left thyroid nodule approx 5 cm. I have performed real time neck and thyroid US. There are no abnormal cervical nodes. The right thyroid lobe is diminutively small. The left sided mass fills and expands the lobe - I do not see a rim of normal thyroid around it. It also does cross into the isthmus some where there is a halo like medial border.   RESP: Lungs clear to auscultation bilaterally  CARDIAC: Regular rate and rhythm, normal S1 S2, without murmurs, rubs or gallops    ABDOMEN: Normal bowel sounds; soft, nontender,  NEURO: awake, alert, responds appropriately to questions.  Cranial nerves intact.  Moves all extremities; Gait normal.  + fine tremor of the outstretched hand - left > right.  DTRs  2 /4 ,   EXTREMITIES: No clubbing, cyanosis or edema.    DATA REVIEW    Copath Report 09/24/2018 12:00 AM 88      Patient Name: AVE CHOE   MR#: [id number]   Specimen #: [id number]   Collected: 9/24/2018   Received: 9/24/2018   Reported: 9/27/2018 09:30   Ordering Phy(s): JC AGUILAR   Additional Phy(s): [facility], Dept. of Pathology     For improved result formatting, select 'View " Enhanced Report Format' under    Linked Documents section.     TEST(S):   5 slides, case #[id number]     FINAL DIAGNOSIS:   CASE FROM [place], [place] ([id number], OBTAINED   07/25/2018):   A. Thyroid, left, ultrasound guided fine needle aspiration:        - Benign   Consistent with a benign nodule (includes adenomatoid nodule, colloid   nodule, etc.)   Specimen Adequacy: Satisfactory for evaluation.     The Embudo implied risk of malignancy and recommended clinical   management:   Benign has a 0-3% risk of malignancy, recommended management is clinical   follow-up.     I have personally reviewed all specimens and/or slides, including the   listed special stains, and used them   with my medical judgement to determine or confirm the final diagnosis.     Electronically signed out by:   Nuno Andersen M.D., [facility]     CLINICAL HISTORY:   The patient is a 48-year-old female.     GROSS:   Received from [facility] in [place], [place] are 5 stained slides   labeled [id number] (obtained 07/25/2018)   and a copy of the referring pathologist's report with patient identifying   information.  All slides are   returned.     MICROSCOPIC:   Microscopic examination is performed.     Kaya Adkins MD Cytopathology fellow      Nuno Andersen III, MD   Attending     CPT Codes:   A: 47962-ODB2     TESTING LAB LOCATION:   [facility]   [facility] [address]   [address]   [phone]     COLLECTION SITE:   Client:  [facility]   Location:  [id number] (B)     Resident   [id number]      NM THYROID UPTAKE W/BLOOD FLOW DONTA GUPTA MEAS7[facility]26/2018  [facility]  Result Narrative   HISTORY:  48-year-old female. A recent thyroid ultrasound of 06/25/2018 reportedly demonstrated a 5 cm diameter solid isoechoic left thyroid nodule with punctate echogenic foci occupying nearly the  entire left lobe. The right lobe of the thyroid gland demonstrate only a 3 mm diameter nodule which had a benign appearance. FNA biopsy of the left thyroid nodule was reportedly performed on 06/25/2018.    TECHNIQUE:  304 microcuries of I 123 was administered orally on 06/25/2018. 24 hour uptake and imaging was performed.    FINDINGS:  The 24 hour uptake is elevated at 40 percent. This is consistent with hyperthyroidism.  Images of the thyroid gland demonstrate high tracer uptake in the left lobe of the thyroid gland. There is a smaller area of decreased uptake in the lateral left lower pole.  No significant uptake is noted in the right lobe of the thyroid gland.    IMPRESSION:  1. The 24 hour uptake is elevated at 40 percent. This is consistent with hyperthyroidism.  2. High uptake in the large left thyroid nodule is suggestive of a hyperfunctioning thyroid adenoma. The smaller area of low uptake in the lower lateral left thyroid gland may represent an area of degeneration. Malignancy is not excluded however. The low uptake in the right lobe of the thyroid gland may represent suppression of uptake due to the hyperfunctioning nodule on the left.  3. Please correlate these findings with the results of the recent FNA biopsy.      Dictated by Bruce Bauman MD @ Jul 26 2018  9:51AM     US THYROID/PARATHYROID6/25/2018  [facility]  Result Narrative   [facility] THYROID/PARATHYROID  6/25/2018 4:54 PM    INDICATION: Neck Swelling  TECHNIQUE: Routine.  COMPARISON: None.    FINDINGS:  RIGHT thyroid lobe measures 2.9 x 0.7 x 0.9 cm. 0.3 x 0.2 x 0.3 cm solid,  hypoechoic, noncalcified nodule is smooth margins in the superior right lobe.      LEFT thyroid lobe measures 5.5 x 2.4 x 3.7 cm. 5.0 x 3.0 x 4.5 cm solid,  isoechoic nodule with punctate echogenic foci occupies nearly the entire left  lobe.    Isthmus measures 2 mm. No additional findings.    No cervical  lymphadenopathy.    CONCLUSION:  1.  5 cm left thyroid nodule is suspicious (TI-RADS 4) and should undergo  ultrasound-guided fine-needle aspiration.  2.  3 mm right thyroid nodule is benign and requires no follow-up.    TI-RADS 1. No FNA.  TI-RADS 2. No FNA.  TI-RADS 3. FNA if >=2.5 cm. Follow up ultrasound in 1 year >=1.5 cm.  TI-RADS 4. FNA if >=1.5cm. Follow up ultrasound in 1 year >=1 cm.  TI-RADS 5. FNA if >=1 cm. Follow up ultrasound in 1 year >=0.5 cm.    -FNA no more than 2 of the most suspicious nodules, if FNA indicated.  -Follow no more than 4 of the most suspicious nodules.    ACR Thyroid Imaging, Reporting and Data System (TI-RADS): White Paper of the ACR  TI-RADS Committee  Jethro Mabry et al. Journal of the American College of Radiology 2017.  Volume 14 (2017), Issue 5, 587-595.

## 2018-10-31 NOTE — PATIENT INSTRUCTIONS
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

## 2018-10-31 NOTE — LETTER
10/31/2018       RE: Chayo Chang  [address]  [place] [address]     Dear Colleague,    Thank you for referring your patient, Chayo Chang, to the [facility] ENDOCRINOLOGY at [facility]. Please see a copy of my visit note below.    Endocrine Consult note    Attending Assessment/Plan :     1.  Left thyroid nodule (5 cm) with high 123I uptake and complete suppression of right lobe. Differential is Toxic adenoma vs MNG (asymmetric) or Graves with absence of right lobe.   Right lobe is abnormally small but not absent on real time imaging.  We expect suppression of the contralateral lobe with such a finding as see on the thyroid scan, but the lobe is so small it also raises questions of left lobe being her only thyroid with congenitally absent right lobe  Because of this question we measured TSI , which was negative.     In the absence of Graves', the size of the left sided process is  sufficiently large that left thyroid lobectomy (and isthmusectomy since the mass projects into the isthmus) may be the best treatment option .  A radioactive iodine dose would have to be quite high and she would also probably continue to have the mass even if we can improve the function.   With 40% uptake , treatment sufficient to deliver 44618 rads to the center of the nodule exceeds 30 mCi (I am calculating 63 mCi minimum)  Https://www.ncbi.nlm.nih.gov/pubmed/523264  5 cm3 volume =62.5     62.5 g (07466 rads)* 6.7 / 5 day T1/2 (40% uptake) 1000=    FRANK for 6/25/18 US images for my review - I would like to see what the right lobe looks like there.  As of 11/7/18 this study has not yet been trasferred to our system for review.    I would be interested in remote thyroid US report relative to size of the right lobe then.     Addendum: 11/7/18 later in the day: 6/25/18 US has now been pushed to PACS  Once again I conclude the right lobe is diminutively small but not absent. There is a  "subcm anterior nodule on the right.   I do not see normal rim of thyroid around the left lobe nodule process    2.  Hyperthyroidism with high 123I uptake left only.  \"Right lobe not seen    3. Absence of uptake on the right lobe, very small size of right thyroid on US.  As above.    She has had appropriate work up to date.     4.  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   I have reviewed Care Everywhere including [facility] lab reports, imaging reports and provider notes as indicated.      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 She believes she had normal TFTS,  US and FNAB at that time. The studies were performed in [place].  She had follow up for a time but eventually forgot about it.      Neck mass was recently noted by one of our colleagues.  She saw her PCP 6/12/18 who ordered TFTS and thyroid US.  The TFTS showed suppressed TSH with high Free T4.  T3 was not measured.  US confirmed a 5 cm left thyroid mass.  I do not have the outside images from this study .  She underwent US guided FNAB of this mass on 7/25/18 with benign cytology as re-read here ([id number]). She was seen by Obdulio Jules (Endocrinology, [facility]) 9/5/18.  At that time it was felt she was asymptomatic on propranolol.       We have the following lab data:   6/12/18: TSH < 0.01, free T4 1.97, TPO < 0.5, creatinine 0.65, Ca 9.7  7/25/18: TSH < 0.01, free T4 1.62  10/31/18 following appt, not Discussed at appt :TSH < 0.01, Free T4 1.93, T3 187, TSI < 1     images   6/22/18 : DXA ([facility]) - lowest T-score -2 right total hip; lowest Z-score -1.7 right total hip;   [facility]/18 [facility] we do not have this on PACS- reportedly shows 5 cm solid left nodule and 0.3 cm right nodule.    7/25/18 thyroid US-images reviewed on PACs " incomplete study - presumably FNAB procedure   7/26/18 123I thyroid uptake/scan:images reviewed on PACS left high uptake ; right not seen . 24 hour uptake 40%    Family history is positive for Graves' in her mother.     REVIEW OF SYSTEMS  A little swelling in feet at the end of the day  Not exercising since L3-L4 disc bulge with a lot of pain   10 system ROS otherwise as per the HPI or negative    Past Medical History  Past Medical History:   Diagnosis Date     Hyperthyroidism 06/2018     Migraines      Seizures (H)      Thyroid nodule 06/2018    reason for appt     Past Surgical History:   Procedure Laterality Date     INJECT EPIDURAL LUMBAR / SACRAL SINGLE Left 5/31/2018    Procedure: INJECT EPIDURAL LUMBAR / SACRAL CONTINUAL OR INTERMITTENT;  Lumbar Epidural interlaminal epidural steroid injection left L3/4;  Surgeon: Joon Watkins MD;  [facility]: [facility]     INJECT EPIDURAL TRANSFORAMINAL LUMBAR / SACRAL SINGLE Left 5/24/2018    Procedure: INJECT EPIDURAL TRANSFORAMINAL LUMBAR / SACRAL SINGLE;  Left transforaminal lumbar epidural injection L3L4;  Surgeon: Joon Watkins MD;  Location: [facility] OR     RELEASE DEQUERVAINS WRIST Left 8/11/2016    Procedure: RELEASE DEQUERVAINS WRIST;  Surgeon: Deisi Lewis MD;  Location: [facility] OR     SKIN INCISION      nodules removed from scalp in childhood      VASCULAR SURGERY       wisdom teeth       4 removed        Medications    Current Outpatient Prescriptions   Medication Sig Dispense Refill     acetaminophen (TYLENOL) 325 MG tablet Take 325-650 mg by mouth every 6 hours as needed       carBAMazepine (TEGRETOL XR) 400 MG 12 hr tablet Take 400 mg by mouth 2 times daily       Cyclobenzaprine HCl (FLEXERIL PO) Take 5 mg by mouth 3 times daily as needed for muscle spasms       ibuprofen (IBUPROFEN) 200 MG tablet Take 1 tablet (200 mg) by mouth every 4 hours as needed 100 tablet 0     PHENobarbital (LUMINAL) 30 MG tablet Take 30 mg by mouth 2 times daily        "propranolol (INDERAL LA) 80 MG capsule Take by mouth At Bedtime        SUMAtriptan (IMITREX) 100 MG tablet Take by mouth at onset of headache         Allergies  Allergies   Allergen Reactions     Ampicillin      Codeine      Family History  family history includes Asthma in an other family member; Hyperthyroidism in her mother; Osteoporosis in an other family member; Thyroid Disease in an other family member.    Social History  Social History   Substance Use Topics     Smoking status: Never Smoker     Smokeless tobacco: Never Used     Alcohol use 4.2 oz/week      Comment: occas     Physical Exam  /85  Pulse 102  Temp 98.6  F (37  C) (Oral)  Ht 1.54 m (5' 0.63\")  Wt 71.4 kg (157 lb 6.4 oz)  BMI 30.1 kg/m2  Body mass index is 30.1 kg/(m^2).  GENERAL : pleasant woman  In no apparent distress  SKIN: Normal color, normal temperature, texture.  No hirsutism, alopecia   EYES: PERRL, EOMI, No scleral icterus,  No proptosis, conjunctival redness, stare, retraction  MOUTH: Moist, pink; pharynx clear  NECK: Subtle visible fullness thyroid bed - No palpable adenopathy, or masses. No carotid bruits.   THYROID:  Left thyroid nodule approx 5 cm. I have performed real time neck and thyroid US. There are no abnormal cervical nodes. The right thyroid lobe is diminutively small. The left sided mass fills and expands the lobe - I do not see a rim of normal thyroid around it. It also does cross into the isthmus some where there is a halo like medial border.   RESP: Lungs clear to auscultation bilaterally  CARDIAC: Regular rate and rhythm, normal S1 S2, without murmurs, rubs or gallops    ABDOMEN: Normal bowel sounds; soft, nontender,  NEURO: awake, alert, responds appropriately to questions.  Cranial nerves intact.  Moves all extremities; Gait normal.  + fine tremor of the outstretched hand - left > right.  DTRs  2 /4 ,   EXTREMITIES: No clubbing, cyanosis or edema.    DATA REVIEW    Copath Report 09/24/2018 12:00 AM 88      " Patient Name: AVE CHOE   MR#: [id number]   Specimen #: [id number]   Collected: 9/24/2018   Received: 9/24/2018   Reported: 9/27/2018 09:30   Ordering Phy(s): JC AGUILAR   Additional Phy(s): [facility], Dept. of Pathology     For improved result formatting, select 'View Enhanced Report Format' under    Linked Documents section.     TEST(S):   5 slides, case #[id number]     FINAL DIAGNOSIS:   CASE FROM [place], [place] ([id number], OBTAINED   07/25/2018):   A. Thyroid, left, ultrasound guided fine needle aspiration:        - Benign   Consistent with a benign nodule (includes adenomatoid nodule, colloid   nodule, etc.)   Specimen Adequacy: Satisfactory for evaluation.     The [place] implied risk of malignancy and recommended clinical   management:   Benign has a 0-3% risk of malignancy, recommended management is clinical   follow-up.     I have personally reviewed all specimens and/or slides, including the   listed special stains, and used them   with my medical judgement to determine or confirm the final diagnosis.     Electronically signed out by:   Nuno Andersen M.D., [facility]     CLINICAL HISTORY:   The patient is a 48-year-old female.     GROSS:   Received from [facility] in [place], [place] are 5 stained slides   labeled [id number] (obtained 07/25/2018)   and a copy of the referring pathologist's report with patient identifying   information.  All slides are   returned.     MICROSCOPIC:   Microscopic examination is performed.     Kaya Adkins MD Cytopathology fellow      Nuno Andersen III, MD   Attending     CPT Codes:   A: 43911-EYS9     TESTING LAB LOCATION:   [address], [address]   [address]   [phone]     COLLECTION SITE:   Client:  [facility]   Location:  [id number] (B)     Resident   [id number]      NM THYROID UPTAKE W/BLOOD FLOW  DONTA GUPTA MEAS7/26/2018  [facility]  Result Narrative   HISTORY:  48-year-old female. A recent thyroid ultrasound of 06/25/2018 reportedly demonstrated a 5 cm diameter solid isoechoic left thyroid nodule with punctate echogenic foci occupying nearly the entire left lobe. The right lobe of the thyroid gland demonstrate only a 3 mm diameter nodule which had a benign appearance. FNA biopsy of the left thyroid nodule was reportedly performed on 06/25/2018.    TECHNIQUE:  304 microcuries of I 123 was administered orally on 06/25/2018. 24 hour uptake and imaging was performed.    FINDINGS:  The 24 hour uptake is elevated at 40 percent. This is consistent with hyperthyroidism.  Images of the thyroid gland demonstrate high tracer uptake in the left lobe of the thyroid gland. There is a smaller area of decreased uptake in the lateral left lower pole.  No significant uptake is noted in the right lobe of the thyroid gland.    IMPRESSION:  1. The 24 hour uptake is elevated at 40 percent. This is consistent with hyperthyroidism.  2. High uptake in the large left thyroid nodule is suggestive of a hyperfunctioning thyroid adenoma. The smaller area of low uptake in the lower lateral left thyroid gland may represent an area of degeneration. Malignancy is not excluded however. The low uptake in the right lobe of the thyroid gland may represent suppression of uptake due to the hyperfunctioning nodule on the left.  3. Please correlate these findings with the results of the recent FNA biopsy.      Dictated by Bruce Bauman MD @ Jul 26 2018  9:51AM     US THYROID/PARATHYROID6/25/2018  [facility]  Result Narrative   [facility]  US THYROID/PARATHYROID  6/25/2018 4:54 PM    INDICATION: Neck Swelling  TECHNIQUE: Routine.  COMPARISON: None.    FINDINGS:  RIGHT thyroid lobe measures 2.9 x 0.7 x 0.9 cm. 0.3 x 0.2 x 0.3 cm solid,  hypoechoic,  noncalcified nodule is smooth margins in the superior right lobe.      LEFT thyroid lobe measures 5.5 x 2.4 x 3.7 cm. 5.0 x 3.0 x 4.5 cm solid,  isoechoic nodule with punctate echogenic foci occupies nearly the entire left  lobe.    Isthmus measures 2 mm. No additional findings.    No cervical lymphadenopathy.    CONCLUSION:  1.  5 cm left thyroid nodule is suspicious (TI-RADS 4) and should undergo  ultrasound-guided fine-needle aspiration.  2.  3 mm right thyroid nodule is benign and requires no follow-up.    TI-RADS 1. No FNA.  TI-RADS 2. No FNA.  TI-RADS 3. FNA if >=2.5 cm. Follow up ultrasound in 1 year >=1.5 cm.  TI-RADS 4. FNA if >=1.5cm. Follow up ultrasound in 1 year >=1 cm.  TI-RADS 5. FNA if >=1 cm. Follow up ultrasound in 1 year >=0.5 cm.    -FNA no more than 2 of the most suspicious nodules, if FNA indicated.  -Follow no more than 4 of the most suspicious nodules.    ACR Thyroid Imaging, Reporting and Data System (TI-RADS): White Paper of the ACR  TI-RADS Committee  Jethro Mabry. et al. Journal of the American College of Radiology 2017.  Volume 14 (2017), Issue 5, 587595.         Again, thank you for allowing me to participate in the care of your patient.      Sincerely,    Ashley Brar MD

## 2018-10-31 NOTE — MR AVS SNAPSHOT
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          Follow-ups after your visit        Who to contact     Please call your clinic at [phone] to:    Ask questions about your health    Make or cancel appointments    Discuss your medicines    Learn about your test results    Speak to your doctor            Additional Information About Your Visit        Care EveryWhere ID     This is your Care EveryWhere ID. This could be used by other organizations to access your [facility] medical records  [id number]        Your Vitals Were     Pulse Temperature Height BMI (Body Mass Index)          102 98.6  F (37  C) (Oral) 1.54 m (5' 0.63\") 30.1 kg/m2         Blood Pressure from Last 3 Encounters:   10/31/18 139/85   05/31/18 119/62   05/24/18 133/70    Weight from Last 3 Encounters:   10/31/18 71.4 kg (157 lb 6.4 oz)   09/19/18 71.1 kg (156 lb 12.8 oz)   05/31/18 72.6 kg (160 lb)               Primary Care Provider Office Phone # Fax #    Talita Quevedo [phone]       [place] [address]        Equal Access to Services     [facility]: Hadii aad ku hadasho Soomaali, waaxda luqadaha, qaybta kaalmada adeegyada, loyd aguilar. So [facility] [phone].    ATENCIÓN: Si habla español, tiene a shoemaker disposición servicios gratuitos de asistencia lingüística. Llame al [phone].    We comply with applicable federal civil rights laws and [place] laws. We do " not discriminate on the basis of race, color, national origin, age, disability, sex, sexual orientation, or gender identity.            Thank you!     Thank you for choosing [facility]  for your care. Our goal is always to provide you with excellent care. Hearing back from our patients is one way we can continue to improve our services. Please take a few minutes to complete the written survey that you may receive in the mail after your visit with us. Thank you!             Your Updated Medication List - Protect others around you: Learn how to safely use, store and throw away your medicines at www.disposemymeds.org.          This list is accurate as of 10/31/18 11:59 PM.  Always use your most recent med list.                   Brand Name Dispense Instructions for use Diagnosis    FLEXERIL PO      Take 5 mg by mouth 3 times daily as needed for muscle spasms        ibuprofen 200 MG tablet    ibuprofen    100 tablet    Take 1 tablet (200 mg) by mouth every 4 hours as needed        IMITREX 100 MG tablet   Generic drug:  SUMAtriptan      Take by mouth at onset of headache        INDERAL LA 80 MG 24 hr capsule   Generic drug:  propranolol      Take by mouth At Bedtime        PHENobarbital 30 MG tablet    LUMINAL     Take 30 mg by mouth 2 times daily        TEGRETOL  MG 12 hr tablet   Generic drug:  carBAMazepine      Take 400 mg by mouth 2 times daily        TYLENOL 325 MG tablet   Generic drug:  acetaminophen      Take 325-650 mg by mouth every 6 hours as needed

## 2018-11-02 LAB — TSI SER-ACNC: <1 TSI INDEX

## 2018-11-07 RX ORDER — METHIMAZOLE 5 MG/1
5 TABLET ORAL DAILY
Qty: 30 TABLET | Refills: 3 | Status: SHIPPED | OUTPATIENT
Start: 2018-11-07 | End: 2019-02-16

## 2018-11-21 ENCOUNTER — OFFICE VISIT (OUTPATIENT)
Dept: OTOLARYNGOLOGY | Facility: CLINIC | Age: 48
End: 2018-11-21
Payer: COMMERCIAL

## 2018-11-21 VITALS — BODY MASS INDEX: 31.41 KG/M2 | HEIGHT: 60 IN | WEIGHT: 160 LBS

## 2018-11-21 DIAGNOSIS — E04.1 THYROID NODULE: Primary | ICD-10-CM

## 2018-11-21 ASSESSMENT — PAIN SCALES - GENERAL: PAINLEVEL: NO PAIN (0)

## 2018-11-21 NOTE — MR AVS SNAPSHOT
After Visit Summary   11/21/2018    Chayo Chang    MRN: [id number]           Patient Information     Date Of Birth          1970        Visit Information        Provider Department      11/21/2018 12:30 PM Otoniel Simon MD [facility] Ear Nose and Throat        Today's Diagnoses     Thyroid nodule    -  1      Care Instructions      1. Cassidy will be in contact to arrange date and time for surgery as well as PAC appointment prior.   2. Please call the ENT clinic with any questions,concerns, new or worsening symptoms.    -Clinic number is [phone]                 Follow-ups after your visit        Who to contact     Please call your clinic at [phone] to:    Ask questions about your health    Make or cancel appointments    Discuss your medicines    Learn about your test results    Speak to your doctor            Additional Information About Your Visit        Care EveryWhere ID     This is your Care EveryWhere ID. This could be used by other organizations to access your [place] medical records  [id number]        Your Vitals Were     Height BMI (Body Mass Index)                1.524 m (5') 31.25 kg/m2           Blood Pressure from Last 3 Encounters:   10/31/18 139/85   05/31/18 119/62   05/24/18 133/70    Weight from Last 3 Encounters:   11/21/18 72.6 kg (160 lb)   10/31/18 71.4 kg (157 lb 6.4 oz)   09/19/18 71.1 kg (156 lb 12.8 oz)              We Performed the Following     Karina-Operative Worksheet (Head & Neck)        Primary Care Provider Office Phone # Fax #    Talita Qeuvedo [phone]       [facility] [address] [place] [id number]        Equal Access to Services     [facility]: Hadii anu ku hadasho Soomaali, waaxda luqadaha, qaybta kaalmada madisonegjohny, loyd aguilar. So [facility] [phone].    ATENCIÓN: Si habla español, tiene a shoemaker disposición servicios gratuitos de asistencia lingüística. Flora al [phone].    We  comply with applicable federal civil rights laws and [place] laws. We do not discriminate on the basis of race, color, national origin, age, disability, sex, sexual orientation, or gender identity.            Thank you!     Thank you for choosing [facility] EAR NOSE AND THROAT  for your care. Our goal is always to provide you with excellent care. Hearing back from our patients is one way we can continue to improve our services. Please take a few minutes to complete the written survey that you may receive in the mail after your visit with us. Thank you!             Your Updated Medication List - Protect others around you: Learn how to safely use, store and throw away your medicines at www.disposemymeds.org.          This list is accurate as of 11/21/18 11:59 PM.  Always use your most recent med list.                   Brand Name Dispense Instructions for use Diagnosis    FLEXERIL PO      Take 5 mg by mouth 3 times daily as needed for muscle spasms        ibuprofen 200 MG tablet    ibuprofen    100 tablet    Take 1 tablet (200 mg) by mouth every 4 hours as needed        IMITREX 100 MG tablet   Generic drug:  SUMAtriptan      Take by mouth at onset of headache        INDERAL LA 80 MG 24 hr capsule   Generic drug:  propranolol      Take by mouth At Bedtime        methimazole 5 MG tablet    TAPAZOLE    30 tablet    Take 1 tablet (5 mg) by mouth daily    Toxic adenoma, Hyperthyroidism, Tremor       PHENobarbital 30 MG tablet    LUMINAL     Take 30 mg by mouth 2 times daily        TEGRETOL  MG 12 hr tablet   Generic drug:  carBAMazepine      Take 400 mg by mouth 2 times daily        TYLENOL 325 MG tablet   Generic drug:  acetaminophen      Take 325-650 mg by mouth every 6 hours as needed

## 2018-11-21 NOTE — PROGRESS NOTES
November 21, 2018    HISTORY OF PRESENT ILLNESS:   Charlene returns after being seen by Dr. Brar on October 31.  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  She considered offering the patient radioactive ablation, but felt that the thyroid was so large that the patient would need to receive a dose of 30,000 rad to deliver 30 mCi to the center of the nodule.  For that reason, Dr. Brar has recommended that the patient undergo left hemithyroidectomy with isthmusectomy (no right lobe).    Dr. Chang is here today to discuss recommended surgery.  We talked about the timing of surgery.  She just does not have the time right now to recover from an operation.  We looked at our calendars and agreed that the first week in March might be the best for her.      In the meantime, if she has the ability to manage her symptoms on methimazole as well as beta blockers, we could potentially wait until that point.  She is going to visit with Dr. Brar to see whether this is an option.        EXAM:  On examination, it is difficult to feel an obvious thyroid.  There is asymmetric fullness on the left side.      IMPRESSION:  We talked about the risks and benefits of the surgery.  The risks include injury to the left recurrent laryngeal nerve which she is well aware of.  The risks of permanent hypocalcemia are low because we would only be doing the left hemithyroid.  However, it is difficult to say what state the parathyroids are in if her right thyroid is atrophic or congenitally absent.      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  Dr Brar will help to manage this, and we would like her to visit with Dr. Evie Sherman from Anesthesia to clear her for surgery closer to the time of the operation, and to have an intra-operative plan ready.  We would need to make sure that she is well controlled before doing the operation so that we do not encounter  an intraoperative or postoperative hypertensive crisis.  She will visit with Dr. Brar, and then we will have an opportunity to talk about timing of surgery.  I spent a total of 15 minutes face to face with Dr Chang during today s office Visit. Over 50% of this time was spent counseling the patient and/or coordinating care regarding the surgery.      Otoniel Simon MD  Otolaryngology/Head & Neck Surgery  [phone]                    Alexander Willett MD  [address]  [place] [address]    Ashley Brar MD  [facility] Endocrinology  [address]  [place] [address]

## 2018-11-21 NOTE — NURSING NOTE
Chief Complaint   Patient presents with     RECHECK     discussion of surgery     Jaime Islas, EMT

## 2018-11-21 NOTE — LETTER
11/21/2018       RE: Chayo Chang  [address]  [place] [address]     Dear Colleague,    Thank you for referring your patient, Chayo Chang, to the [facility]AT at [facility]. Please see a copy of my visit note below.    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  She considered offering the patient radioactive ablation, but felt that the thyroid was so large that the patient would need to receive a dose of 30,000 rad to deliver 30 mCi to the center of the nodule.  For that reason, Dr. Brar has recommended that the patient undergo left hemithyroidectomy with isthmusectomy (no right lobe).    Dr. Chang is here today to discuss recommended surgery.  We talked about the timing of surgery.  She just does not have the time right now to recover from an operation.  We looked at our calendars and agreed that the first week in March might be the best for her.      In the meantime, if she has the ability to manage her symptoms on methimazole as well as beta blockers, we could potentially wait until that point.  She is going to visit with Dr. Brar to see whether this is an option.        EXAM:  On examination, it is difficult to feel an obvious thyroid.  There is asymmetric fullness on the left side.      IMPRESSION:  We talked about the risks and benefits of the surgery.  The risks include injury to the left recurrent laryngeal nerve which she is well aware of.  The risks of permanent hypocalcemia are low because we would only be doing the left hemithyroid.  However, it is difficult to say what state the parathyroids are in if her right thyroid is atrophic or congenitally absent.      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  Dr Brar will help to  manage this, and we would like her to visit with Dr. Evie Sherman from Anesthesia to clear her for surgery closer to the time of the operation, and to have an intra-operative plan ready.  We would need to make sure that she is well controlled before doing the operation so that we do not encounter an intraoperative or postoperative hypertensive crisis.  She will visit with Dr. Brar, and then we will have an opportunity to talk about timing of surgery.  I spent a total of 15 minutes face to face with Dr Chang during today s office Visit. Over 50% of this time was spent counseling the patient and/or coordinating care regarding the surgery.      Otoniel Simon MD  Otolaryngology/Head & Neck Surgery  [phone]                  CC  Alexander Willett MD  [address]  [place] [address]    Ashley Brar MD  [facility] Endocrinology  [address]  [place] [address]

## 2018-11-21 NOTE — PATIENT INSTRUCTIONS
1. Cassidy will be in contact to arrange date and time for surgery as well as PAC appointment prior.   2. Please call the ENT clinic with any questions,concerns, new or worsening symptoms.    -Clinic number is [phone]

## 2019-01-08 DIAGNOSIS — R25.1 TREMOR: ICD-10-CM

## 2019-01-08 DIAGNOSIS — E05.10 TOXIC ADENOMA: ICD-10-CM

## 2019-01-08 DIAGNOSIS — E05.90 HYPERTHYROIDISM: ICD-10-CM

## 2019-01-08 LAB
T3 SERPL-MCNC: 111 NG/DL (ref 60–181)
T4 FREE SERPL-MCNC: 1.37 NG/DL (ref 0.76–1.46)
TSH SERPL DL<=0.005 MIU/L-ACNC: <0.01 MU/L (ref 0.4–4)

## 2019-02-15 ENCOUNTER — ANESTHESIA EVENT (OUTPATIENT)
Dept: SURGERY | Facility: CLINIC | Age: 49
End: 2019-02-15
Payer: COMMERCIAL

## 2019-02-15 ENCOUNTER — OFFICE VISIT (OUTPATIENT)
Dept: SURGERY | Facility: CLINIC | Age: 49
End: 2019-02-15
Payer: COMMERCIAL

## 2019-02-15 VITALS
HEART RATE: 68 BPM | HEIGHT: 61 IN | BODY MASS INDEX: 30.72 KG/M2 | DIASTOLIC BLOOD PRESSURE: 84 MMHG | OXYGEN SATURATION: 97 % | WEIGHT: 162.7 LBS | SYSTOLIC BLOOD PRESSURE: 129 MMHG

## 2019-02-15 DIAGNOSIS — Z01.818 PRE-OP EXAMINATION: Primary | ICD-10-CM

## 2019-02-15 DIAGNOSIS — E04.1 THYROID NODULE: ICD-10-CM

## 2019-02-15 DIAGNOSIS — E05.90 HYPERTHYROIDISM: ICD-10-CM

## 2019-02-15 DIAGNOSIS — R25.1 TREMOR: ICD-10-CM

## 2019-02-15 DIAGNOSIS — E05.10 TOXIC ADENOMA: ICD-10-CM

## 2019-02-15 LAB
ANION GAP SERPL CALCULATED.3IONS-SCNC: 6 MMOL/L (ref 3–14)
BASOPHILS # BLD AUTO: 0 10E9/L (ref 0–0.2)
BASOPHILS NFR BLD AUTO: 0.6 %
BUN SERPL-MCNC: 16 MG/DL (ref 7–30)
CALCIUM SERPL-MCNC: 8.6 MG/DL (ref 8.5–10.1)
CHLORIDE SERPL-SCNC: 103 MMOL/L (ref 94–109)
CO2 SERPL-SCNC: 28 MMOL/L (ref 20–32)
CREAT SERPL-MCNC: 0.53 MG/DL (ref 0.52–1.04)
DIFFERENTIAL METHOD BLD: NORMAL
EOSINOPHIL # BLD AUTO: 0.1 10E9/L (ref 0–0.7)
EOSINOPHIL NFR BLD AUTO: 0.8 %
ERYTHROCYTE [DISTWIDTH] IN BLOOD BY AUTOMATED COUNT: 12.1 % (ref 10–15)
GFR SERPL CREATININE-BSD FRML MDRD: >90 ML/MIN/{1.73_M2}
GLUCOSE SERPL-MCNC: 100 MG/DL (ref 70–99)
HCT VFR BLD AUTO: 43.2 % (ref 35–47)
HGB BLD-MCNC: 14.8 G/DL (ref 11.7–15.7)
IMM GRANULOCYTES # BLD: 0 10E9/L (ref 0–0.4)
IMM GRANULOCYTES NFR BLD: 0.1 %
LYMPHOCYTES # BLD AUTO: 3.3 10E9/L (ref 0.8–5.3)
LYMPHOCYTES NFR BLD AUTO: 46.2 %
MCH RBC QN AUTO: 31.4 PG (ref 26.5–33)
MCHC RBC AUTO-ENTMCNC: 34.3 G/DL (ref 31.5–36.5)
MCV RBC AUTO: 92 FL (ref 78–100)
MONOCYTES # BLD AUTO: 0.3 10E9/L (ref 0–1.3)
MONOCYTES NFR BLD AUTO: 4.7 %
NEUTROPHILS # BLD AUTO: 3.4 10E9/L (ref 1.6–8.3)
NEUTROPHILS NFR BLD AUTO: 47.6 %
NRBC # BLD AUTO: 0 10*3/UL
NRBC BLD AUTO-RTO: 0 /100
PLATELET # BLD AUTO: 190 10E9/L (ref 150–450)
POTASSIUM SERPL-SCNC: 4.2 MMOL/L (ref 3.4–5.3)
RBC # BLD AUTO: 4.72 10E12/L (ref 3.8–5.2)
SODIUM SERPL-SCNC: 137 MMOL/L (ref 133–144)
T3 SERPL-MCNC: 103 NG/DL (ref 60–181)
T4 FREE SERPL-MCNC: 1.22 NG/DL (ref 0.76–1.46)
TSH SERPL DL<=0.005 MIU/L-ACNC: <0.01 MU/L (ref 0.4–4)
WBC # BLD AUTO: 7.1 10E9/L (ref 4–11)

## 2019-02-15 RX ORDER — SCOLOPAMINE TRANSDERMAL SYSTEM 1 MG/1
1 PATCH, EXTENDED RELEASE TRANSDERMAL ONCE
Status: CANCELLED | OUTPATIENT
Start: 2019-02-15 | End: 2019-02-15

## 2019-02-15 ASSESSMENT — MIFFLIN-ST. JEOR: SCORE: 1295.38

## 2019-02-15 ASSESSMENT — PAIN SCALES - GENERAL: PAINLEVEL: NO PAIN (0)

## 2019-02-15 ASSESSMENT — ENCOUNTER SYMPTOMS: SEIZURES: 1

## 2019-02-15 NOTE — H&P
Pre-Operative H & P     CC:  Preoperative exam to assess for increased cardiopulmonary risk while undergoing surgery and anesthesia.    Date of Encounter: 2/15/2019  Primary Care Physician:  Talita Quevedo  Reason:   left thyroid mass    HPI  Chayo Chang is a 49 year old female who presents for pre-operative H & P in preparation for  Left Hemithyroidectomy And Isthmusectomy on 3/4/2019 with Dr. Simon at [facility] under [facility].  Ms. Chang was seen by Dr. Simon on 11/21/18 for 5 cm left thyroid mass.  FNAB on 7/25/18 had benign cytology.  Given the size of the mass, the above surgical intervention was recommended.  Her symptoms are currently being managed with methimazole and propranolol.     Dr. Chang presents to PAC and denies any cardiopulmonary history or symptoms.  She does endorse PONV.  She will be seeing Dr. Sherman today for an intraoperative plan for concern for thyroid storm.  Dr. Chang would like to proceed.         History is obtained from the patient and electronic health record.     Past Medical History  Past Medical History:   Diagnosis Date     Hyperthyroidism 06/2018     Migraines      Seizures (H)      Thyroid nodule 06/2018    reason for appt       Past Surgical History  Past Surgical History:   Procedure Laterality Date     INJECT EPIDURAL LUMBAR / SACRAL SINGLE Left 5/31/2018    Procedure: INJECT EPIDURAL LUMBAR / SACRAL CONTINUAL OR INTERMITTENT;  Lumbar Epidural interlaminal epidural steroid injection left L3/4;  Surgeon: Joon Watkins MD;  [facility]     INJECT EPIDURAL TRANSFORAMINAL LUMBAR / SACRAL SINGLE Left 5/24/2018    Procedure: INJECT EPIDURAL TRANSFORAMINAL LUMBAR / SACRAL SINGLE;  Left transforaminal lumbar epidural injection L3L4;  Surgeon: Joon Watkins MD;  [facility]     RELEASE DEQUERVAINS WRIST Left 8/11/2016    Procedure: RELEASE DEQUERVAINS WRIST;  Surgeon: Deisi Lewis MD;  [facility]     SKIN INCISION      nodules removed from scalp in  childhood      VASCULAR SURGERY       wisdom teeth       4 removed        Hx of Blood transfusions/reactions: denies     Hx of abnormal bleeding or anti-platelet use: denies    Menstrual history: No LMP recorded.:2/3/19    Steroid use in the last year: denies     Personal or FH with difficulty with Anesthesia:  PONV    Prior to Admission Medications  Current Outpatient Medications   Medication Sig Dispense Refill     acetaminophen (TYLENOL) 325 MG tablet Take 325-650 mg by mouth every 6 hours as needed       carBAMazepine (TEGRETOL XR) 400 MG 12 hr tablet Take 400 mg by mouth 2 times daily       ibuprofen (IBUPROFEN) 200 MG tablet Take 1 tablet (200 mg) by mouth every 4 hours as needed 100 tablet 0     methimazole (TAPAZOLE) 5 MG tablet Take 1 tablet (5 mg) by mouth daily 30 tablet 3     PHENobarbital (LUMINAL) 30 MG tablet Take 30 mg by mouth 2 times daily       propranolol (INDERAL LA) 80 MG capsule Take by mouth At Bedtime        SUMAtriptan (IMITREX) 100 MG tablet Take by mouth at onset of headache       Cyclobenzaprine HCl (FLEXERIL PO) Take 5 mg by mouth 3 times daily as needed for muscle spasms         Allergies  Allergies   Allergen Reactions     Ampicillin      Codeine        Social History  Social History     Socioeconomic History     Marital status:      Spouse name: Not on file     Number of children: Not on file     Years of education: Not on file     Highest education level: Not on file   Social Needs     Financial resource strain: Not on file     Food insecurity - worry: Not on file     Food insecurity - inability: Not on file     Transportation needs - medical: Not on file     Transportation needs - non-medical: Not on file   Occupational History     Not on file   Tobacco Use     Smoking status: Never Smoker     Smokeless tobacco: Never Used   Substance and Sexual Activity     Alcohol use: Yes     Alcohol/week: 4.2 oz     Comment: occas     Drug use: No     Sexual activity: Not Currently      "Partners: Male     Birth control/protection: Diaphragm, Spermicide   Other Topics Concern     Not on file   Social History Narrative     Not on file       Family History  Family History   Problem Relation Age of Onset     Asthma Other      Osteoporosis Other      Thyroid Disease Other      Hyperthyroidism Mother         graves, 131I      Asthma Mother            ROS/MED HX    ENT/Pulmonary:  - neg pulmonary ROS     Neurologic:     (+)migraines (Every 4-6 weeks. ), seizures last seizure: `> 10 YEARS features: [place],     Cardiovascular:  - neg cardiovascular ROS   (+) ----. : . . . :. . No previous cardiac testing       METS/Exercise Tolerance:  >4 METS   Hematologic:  - neg hematologic  ROS       Musculoskeletal: Comment: Left wrist fracture s/p surgical intervention         GI/Hepatic:  - neg GI/hepatic ROS       Renal/Genitourinary:  - ROS Renal section negative       Endo:     (+) thyroid problem (Toxic goiter) .      Psychiatric:  - neg psychiatric ROS       Infectious Disease:  - neg infectious disease ROS       Malignancy:      - no malignancy   Other:    (+) Possibly pregnant LMP: 2/3/2019, C-spine cleared: Yes, no H/O Chronic Pain,no other significant disability              BP: 129/84 Pulse: 68     SpO2: 97 %         162 lbs 11.2 oz  5' 1\"   Body mass index is 30.74 kg/m .       Physical Exam  Constitutional: Awake, alert, cooperative, no apparent distress, and appears stated age.  Eyes: Pupils equal, round and reactive to light, extra ocular muscles intact, sclera clear, conjunctiva normal.  HENT: Normocephalic, oral pharynx with moist mucus membranes, good dentition. Goiter appreciated.   Respiratory: Clear to auscultation bilaterally, no crackles or wheezing.  Cardiovascular: Regular rate and rhythm, normal S1 and S2, and no murmur noted.  Carotids +2, no bruits. No edema. Palpable pulses to radial  DP and PT arteries.   GI: Normal bowel sounds, soft, non-distended, non-tender, no masses palpated, no " hepatosplenomegaly.    Lymph/Hematologic: No cervical lymphadenopathy and no supraclavicular lymphadenopathy.  Genitourinary:  deferred  Skin: Warm and dry.  No rashes at anticipated surgical site. Right lower back area with well healed surgical scar from prior lipoma resection.    Musculoskeletal: Full ROM of neck. There is no redness, warmth, or swelling of the joints. Gross motor strength is normal.    Neurologic: Awake, alert, oriented to name, place and time. Cranial nerves II-XII are grossly intact. Gait is normal.   Neuropsychiatric: Calm, cooperative. Normal affect.     Labs: (personally reviewed)  Lab Results   Component Value Date    WBC 7.1 02/15/2019     Lab Results   Component Value Date    RBC 4.72 02/15/2019     Lab Results   Component Value Date    HGB 14.8 02/15/2019     Lab Results   Component Value Date    HCT 43.2 02/15/2019     Lab Results   Component Value Date    MCV 92 02/15/2019     Lab Results   Component Value Date    MCH 31.4 02/15/2019     Lab Results   Component Value Date    MCHC 34.3 02/15/2019     Lab Results   Component Value Date    RDW 12.1 02/15/2019     Lab Results   Component Value Date     02/15/2019       Last Comprehensive Metabolic Panel:  Sodium   Date Value Ref Range Status   02/15/2019 137 133 - 144 mmol/L Final     Potassium   Date Value Ref Range Status   02/15/2019 4.2 3.4 - 5.3 mmol/L Final     Chloride   Date Value Ref Range Status   02/15/2019 103 94 - 109 mmol/L Final     Carbon Dioxide   Date Value Ref Range Status   02/15/2019 28 20 - 32 mmol/L Final     Anion Gap   Date Value Ref Range Status   02/15/2019 6 3 - 14 mmol/L Final     Glucose   Date Value Ref Range Status   02/15/2019 100 (H) 70 - 99 mg/dL Final     Urea Nitrogen   Date Value Ref Range Status   02/15/2019 16 7 - 30 mg/dL Final     Creatinine   Date Value Ref Range Status   02/15/2019 0.53 0.52 - 1.04 mg/dL Final     GFR Estimate   Date Value Ref Range Status   02/15/2019 >90 >60  mL/min/[1.73_m2] Final     Comment:     Non  GFR Calc  Starting 12/18/2018, serum creatinine based estimated GFR (eGFR) will be   calculated using the Chronic Kidney Disease Epidemiology Collaboration   (CKD-EPI) equation.       Calcium   Date Value Ref Range Status   02/15/2019 8.6 8.5 - 10.1 mg/dL Final         ASSESSMENT and PLAN  Chayo Chang is a 49 year old female scheduled to undergo  Left Hemithyroidectomy And Isthmusectomy on 3/4/2019 with Dr. Simon at [facility] under [facility].     She has the following specific operative considerations:   - METS:  >4. RCRI : No serious cardiac risks.  0.4 % risk of major adverse cardiac event.  - TREY # of risks 2/8 = low risk  - VTE risk:  0.26%  - Known PONV:  anti-emetic intervention recommended.  Recommend scopolamine patch DOS.     #  Cardiology - denies chest pain, SOB, palpitations, syncope, MERIDA, orthopnea, or PND.  Denies any known coronary artery disease.        #  Pulmonary - no smoking hx  #  GI - Obesity: Recommend careful positioning to prevent airway/ventilatory compromise, or tissue injury.    #  Endocrine - Toxic goiter with Left thyroid nodule (5 cm)>>>above surgery planned.  Take methimazole and propranolol DOS.    #  Neuro - Seizure disorder, last seizure greater than 10 years ago.  Take phenobarbital and carbamazepine as prescribed DOS.        - Migraines, use sumatriptan as needed.      - Anesthesia considerations:  Refer to PAC assessment in anesthesia records      Arrival time, NPO, shower and medication instructions provided by nursing staff today.  Preparing For Your Surgery handout given.  Patient was discussed with Dr Sherman. I spent 30 minutes face to face with patient assessing, educating, counseling and/or coordinating care and examining the patient.  Of that 30 minutes, I spent greater than 50% of my time counseling and/or coordinating care.        ROMÁN Friedman CNP  Preoperative Assessment Center  [facility]  [place]  [facility]  Phone: [phone]  Fax: [phone]

## 2019-02-15 NOTE — ANESTHESIA PREPROCEDURE EVALUATION
Anesthesia Pre-Procedure Evaluation    Patient: Chayo Chang   MRN:     [id number] Gender:   female   Age:    49 year old :      1970        Preoperative Diagnosis: Thyroid Nodule    Procedure(s):  Left Hemithyroidectomy And Isthmusectomy     Past Medical History:   Diagnosis Date     Hyperthyroidism 2018     Migraines      Seizures (H)      Thyroid nodule 2018    reason for appt      Past Surgical History:   Procedure Laterality Date     INJECT EPIDURAL LUMBAR / SACRAL SINGLE Left 2018    Procedure: INJECT EPIDURAL LUMBAR / SACRAL CONTINUAL OR INTERMITTENT;  Lumbar Epidural interlaminal epidural steroid injection left L3/4;  Surgeon: Joon Watkins MD;  [facility]     INJECT EPIDURAL TRANSFORAMINAL LUMBAR / SACRAL SINGLE Left 2018    Procedure: INJECT EPIDURAL TRANSFORAMINAL LUMBAR / SACRAL SINGLE;  Left transforaminal lumbar epidural injection L3L4;  Surgeon: Joon Watkins MD;  [facility]     RELEASE DEQUERVAINS WRIST Left 2016    Procedure: RELEASE DEQUERVAINS WRIST;  Surgeon: Deisi Lewis MD;  [facility]     SKIN INCISION      nodules removed from scalp in childhood      VASCULAR SURGERY       wisdom teeth       4 removed           Anesthesia Evaluation     . Pt has had prior anesthetic. Type: General and MAC    History of anesthetic complications   - PONV        ROS/MED HX    ENT/Pulmonary:  - neg pulmonary ROS     Neurologic:     (+)migraines (Every 4-6 weeks. ), seizures last seizure: `> 10 YEARS features: [place],     Cardiovascular:  - neg cardiovascular ROS   (+) ----. : . . . :. . No previous cardiac testing       METS/Exercise Tolerance:  >4 METS   Hematologic:  - neg hematologic  ROS       Musculoskeletal: Comment: Left wrist fracture s/p surgical intervention         GI/Hepatic:  - neg GI/hepatic ROS       Renal/Genitourinary:  - ROS Renal section negative       Endo:     (+) thyroid problem (Toxic goiter) .      Psychiatric:  -  "neg psychiatric ROS       Infectious Disease:  - neg infectious disease ROS       Malignancy:      - no malignancy   Other:    (+) Possibly pregnant LMP: 2/3/2019, C-spine cleared: Yes, no H/O Chronic Pain,no other significant disability                        PHYSICAL EXAM:   Mental Status/Neuro: A/A/O   Airway: Facies: Feasible  Mallampati: I  Mouth/Opening: Full  TM distance: > 6 cm  Neck ROM: Full   Respiratory: Auscultation: CTAB     Resp. Rate: Normal     Resp. Effort: Normal      CV: Rhythm: Regular  Rate: Age appropriate  Heart: Normal Sounds   Comments:      Dental: Normal                  Lab Results   Component Value Date    TSH <0.01 (L) 01/08/2019    T4 1.37 01/08/2019    T3 111 01/08/2019    HCG Negative 05/31/2018       Preop Vitals  BP Readings from Last 3 Encounters:   02/15/19 129/84   10/31/18 139/85   05/31/18 119/62    Pulse Readings from Last 3 Encounters:   02/15/19 68   10/31/18 102   05/31/18 66      Resp Readings from Last 3 Encounters:   05/31/18 16   05/24/18 15   05/22/18 18    SpO2 Readings from Last 3 Encounters:   02/15/19 97%   05/31/18 100%   05/24/18 96%      Temp Readings from Last 1 Encounters:   10/31/18 98.6  F (37  C) (Oral)    Ht Readings from Last 1 Encounters:   02/15/19 1.549 m (5' 1\")      Wt Readings from Last 1 Encounters:   02/15/19 73.8 kg (162 lb 11.2 oz)    Estimated body mass index is 30.74 kg/m  as calculated from the following:    Height as of this encounter: 1.549 m (5' 1\").    Weight as of this encounter: 73.8 kg (162 lb 11.2 oz).     LDA:  Peripheral IV 08/11/16 Right Hand (Active)   Number of days: 918            Assessment:   ASA SCORE: 3    NPO Status: > 2 hours since completed Clear Liquids   Documentation: H&P complete; Preop Testing complete; Consents complete   Proceeding: Proceed without further delay  Tobacco Use:  NO Active use of Tobacco/UNKNOWN Tobacco use status     Plan:   Anes. Type:  General   Pre-Induction: Midazolam IV; Acetaminophen PO "   Induction:  IV (Standard)   Airway: Oral ETT   Access/Monitoring: PIV   Maintenance: Balanced   Emergence: Procedure Site   Logistics: Same Day Surgery     Postop Pain/Sedation Strategy:  Standard-Options: Opioids PRN     PONV Management:  Adult Risk Factors: Female, Non-Smoker, Postop Opioids     CONSENT: Direct conversation   Plan and risks discussed with: Patient          Comments for Plan/Consent:  History and physical assessed; Patient examined.  I have reviewed and agree with this pre-op assessment and anesthetic plan.  Patient and family also agree.   Risks and alternatives presented and discussed.   AQA.  PLAN  WIll give extra dose of 5 mg PO methimazole,,,, trying to get IV Inderal if available from pharmacy.  LTA with lidocaine  -rcp                    PAC Discussion and Assessment    ASA Classification: 2  Case is suitable for: [place]  Anesthetic techniques and relevant risks discussed:   Invasive monitoring and risk discussed:   Types:   Possibility and Risk of blood transfusion discussed:   NPO instructions given:   Additional anesthetic preparation and risks discussed:   Needs early admission to pre-op area:   Other:     PAC Resident/NP Anesthesia Assessment:  Chayo Chang is a 49-year-old female scheduled for Left Hemithyroidectomy And Isthmusectomy on 3/4/2019 with Dr. Simon at [facility] under [facility].  Ms. Chang was seen by Dr. Simon on 11/21/18 for 5 cm left thyroid mass.  FNAB on 7/25/18 had benign cytology.  Given the size of the mass, the above surgical intervention was recommended.  Her symptoms are currently being managed with methimazole and propranolol.     She has the following specific operative considerations:   - METS:  >4. RCRI : No serious cardiac risks.  0.4 % risk of major adverse cardiac event.  - TREY # of risks 2/8 = low risk  - VTE risk:  0.26%  - Known PONV:  anti-emetic intervention recommended.  Recommend scopolamine patch DOS.     #  Cardiology - denies chest pain, SOB, palpitations,  syncope, MERIDA, orthopnea, or PND.  Denies any known coronary artery disease.        #  Pulmonary - no smoking hx  #  GI - Obesity: Recommend careful positioning to prevent airway/ventilatory compromise, or tissue injury.    #  Endocrine - Toxic goiter with Left thyroid nodule (5 cm)>>>above surgery planned.  Take methimazole and propranolol DOS.    #  Neuro - Seizure disorder, last seizure greater than 10 years ago.  Take phenobarbital and carbamazepine as prescribed DOS.        - Migraines, use sumatriptan as needed.      - Anesthesia considerations:  Refer to PAC assessment in anesthesia records      Arrival time, NPO, shower and medication instructions provided by nursing staff today.  Preparing For Your Surgery handout given.  Patient was discussed with Dr Sherman. I spent 30 minutes face to face with patient assessing, educating, counseling and/or coordinating care and examining the patient.  Of that 30 minutes, I spent greater than 50% of my time counseling and/or coordinating care.      Reviewed and Signed by PAC Mid-Level Provider/Resident  Mid-Level Provider/Resident: Arlene FRANCE CNP  Date: 2/15/19  Time: 10:33    Attending Anesthesiologist Anesthesia Assessment:  49 year old for left hemithyroidectomy and isthmus ectomy in management of left thyroid mass. No airway compression of compromise. Her thyroid hormones are normal, she is on propranolol and methimizole.     Known PONV, we discussed scopolamine patch, but she would prefer to just have dual prophylaxis and avoid the patch.     Well controlled seizure disorder.     Patient/case discussed with TANIA/resident; agree with above assessment. No need to see patient. Patient is appropriate for the planned procedure without further work-up or medical management.      Reviewed and Signed by PAC Anesthesiologist  Anesthesiologist: muriel  Date: 2/15/2019  Time:   Pass/Fail: Pass  Disposition:     PAC Pharmacist Assessment:        Pharmacist:   Date:    Time:        ROMÁN Friedman CNP

## 2019-02-15 NOTE — PATIENT INSTRUCTIONS
Preparing for Your Surgery      Name:  Chayo Chang   MRN:  [id number]   :  1970   Today's Date:  2/15/2019     Arriving for surgery:  Surgery date:  3/4/19  Arrival time:  7:40AM  Please come to:       [facility] [address]  [address] [place]  [address]    -   parking is available in front of the hospital from 5:15 am to 8:00 pm    -  Stop at the Information Desk in the lobby    -   Inform the information person that you are here for surgery. An escort to 3c will be provided. If you would not like an escort, please proceed to 3C on the 3rd floor. [phone]     What can I eat or drink?  -  You may have solid food or milk products until 8 hours prior to your surgery. 3/4/19, 1:40AM  -  You may have water, apple juice or 7up/Sprite until 2 hours prior to your surgery. 3/4/19, 7:40AM    Which medicines can I take?  Stop Aspirin, vitamins and supplements one week prior to surgery.  Hold Imitrex and Ibuprofen for 24 hours and/or Naproxen for 48 hours prior to surgery.     -  Please take these medications the day of surgery:   Carbamazepine(Tegretol)  Methimazole(Tapazole)   Phenobarbital(Luminal)     Acetaminophen  And Flexeril as needed    How do I prepare myself?  -  Take two showers: one the night before surgery; and one the morning of surgery.         Use Scrubcare or Hibiclens to wash from neck down.  You may use your own shampoo and conditioner. No other hair products.   -  Do NOT use lotion, powder, deodorant, or antiperspirant the day of your surgery.  -  Do NOT wear any makeup, fingernail polish or jewelry.  - Do not bring your own medications to the hospital, except for inhalers and eye drops.  -  Bring your ID and insurance card.    Questions or Concerns:  -If you have questions or concerns regarding the day of surgery, please call [phone].     -For questions after surgery please call your surgeons office.           AFTER YOUR  SURGERY  Breathing exercises   Breathing exercises help you recover faster. Take deep breaths and let the air out slowly. This will:     Help you wake up after surgery.    Help prevent complications like pneumonia.  Preventing complications will help you go home sooner.   Nausea and vomiting   You may feel sick to your stomach after surgery; if so, let your nurse know.    Pain control:  After surgery, you may have pain. Our goal is to help you manage your pain. Pain medicine will help you feel comfortable enough to do activities that will help you heal.  These activities may include breathing exercises, walking and physical therapy.   To help your health care team treat your pain we will ask: 1) If you have pain  2) where it is located 3) describe your pain in your words  Methods of pain control include medications given by mouth, vein or by nerve block for some surgeries.  Sequential Compression Device (SCD) or Pneumo Boots:  You may need to wear SCD S on your legs or feet. These are wraps connected to a machine that pumps in air and releases it. The repeated pumping helps prevent blood clots from forming.

## 2019-02-16 DIAGNOSIS — E05.90 HYPERTHYROIDISM: ICD-10-CM

## 2019-02-16 DIAGNOSIS — R25.1 TREMOR: ICD-10-CM

## 2019-02-16 DIAGNOSIS — E05.10 TOXIC ADENOMA: ICD-10-CM

## 2019-02-16 RX ORDER — METHIMAZOLE 5 MG/1
5 TABLET ORAL DAILY
Qty: 30 TABLET | Refills: 3 | Status: SHIPPED | OUTPATIENT
Start: 2019-02-16 | End: 2019-03-05

## 2019-02-20 ENCOUNTER — DOCUMENTATION ONLY (OUTPATIENT)
Dept: CARE COORDINATION | Facility: CLINIC | Age: 49
End: 2019-02-20

## 2019-03-04 ENCOUNTER — HOSPITAL ENCOUNTER (OUTPATIENT)
Facility: CLINIC | Age: 49
Setting detail: OBSERVATION
Discharge: HOME | End: 2019-03-04
Attending: OTOLARYNGOLOGY | Admitting: OTOLARYNGOLOGY
Payer: COMMERCIAL

## 2019-03-04 ENCOUNTER — ANESTHESIA (OUTPATIENT)
Dept: SURGERY | Facility: CLINIC | Age: 49
End: 2019-03-04
Payer: COMMERCIAL

## 2019-03-04 VITALS
BODY MASS INDEX: 30.51 KG/M2 | HEART RATE: 83 BPM | SYSTOLIC BLOOD PRESSURE: 113 MMHG | WEIGHT: 161.6 LBS | HEIGHT: 61 IN | RESPIRATION RATE: 16 BRPM | DIASTOLIC BLOOD PRESSURE: 72 MMHG | OXYGEN SATURATION: 96 % | TEMPERATURE: 99 F

## 2019-03-04 DIAGNOSIS — E05.10 TOXIC ADENOMA: Primary | ICD-10-CM

## 2019-03-04 PROBLEM — E04.1: Status: ACTIVE | Noted: 2019-03-04

## 2019-03-04 LAB
ABO + RH BLD: NORMAL
ABO + RH BLD: NORMAL
ANION GAP SERPL CALCULATED.3IONS-SCNC: 7 MMOL/L (ref 3–14)
BLD GP AB SCN SERPL QL: NORMAL
BLOOD BANK CMNT PATIENT-IMP: NORMAL
BUN SERPL-MCNC: 16 MG/DL (ref 7–30)
CA-I BLD-MCNC: 4.7 MG/DL (ref 4.4–5.2)
CALCIUM SERPL-MCNC: 7.9 MG/DL (ref 8.5–10.1)
CHLORIDE SERPL-SCNC: 108 MMOL/L (ref 94–109)
CO2 SERPL-SCNC: 23 MMOL/L (ref 20–32)
CREAT SERPL-MCNC: 0.49 MG/DL (ref 0.52–1.04)
ERYTHROCYTE [DISTWIDTH] IN BLOOD BY AUTOMATED COUNT: 12.8 % (ref 10–15)
GFR SERPL CREATININE-BSD FRML MDRD: >90 ML/MIN/{1.73_M2}
GLUCOSE BLDC GLUCOMTR-MCNC: 108 MG/DL (ref 70–99)
GLUCOSE SERPL-MCNC: 125 MG/DL (ref 70–99)
HCG UR QL: NEGATIVE
HCT VFR BLD AUTO: 43.2 % (ref 35–47)
HGB BLD-MCNC: 14.3 G/DL (ref 11.7–15.7)
MCH RBC QN AUTO: 31.6 PG (ref 26.5–33)
MCHC RBC AUTO-ENTMCNC: 33.1 G/DL (ref 31.5–36.5)
MCV RBC AUTO: 95 FL (ref 78–100)
PLATELET # BLD AUTO: 164 10E9/L (ref 150–450)
POTASSIUM SERPL-SCNC: 3.9 MMOL/L (ref 3.4–5.3)
PTH-INTACT SERPL-MCNC: 96 PG/ML (ref 18–80)
RBC # BLD AUTO: 4.53 10E12/L (ref 3.8–5.2)
SODIUM SERPL-SCNC: 138 MMOL/L (ref 133–144)
SPECIMEN EXP DATE BLD: NORMAL
WBC # BLD AUTO: 15.6 10E9/L (ref 4–11)

## 2019-03-04 PROCEDURE — 37000008 ZZH ANESTHESIA TECHNICAL FEE, 1ST 30 MIN: Performed by: OTOLARYNGOLOGY

## 2019-03-04 PROCEDURE — 25000128 H RX IP 250 OP 636: Performed by: ANESTHESIOLOGY

## 2019-03-04 PROCEDURE — 36000062 ZZH SURGERY LEVEL 4 1ST 30 MIN - UMMC: Performed by: OTOLARYNGOLOGY

## 2019-03-04 PROCEDURE — 25000128 H RX IP 250 OP 636: Performed by: NURSE ANESTHETIST, CERTIFIED REGISTERED

## 2019-03-04 PROCEDURE — 25000566 ZZH SEVOFLURANE, EA 15 MIN: Performed by: OTOLARYNGOLOGY

## 2019-03-04 PROCEDURE — 88307 TISSUE EXAM BY PATHOLOGIST: CPT | Performed by: OTOLARYNGOLOGY

## 2019-03-04 PROCEDURE — 25000132 ZZH RX MED GY IP 250 OP 250 PS 637: Performed by: ANESTHESIOLOGY

## 2019-03-04 PROCEDURE — 85027 COMPLETE CBC AUTOMATED: CPT | Performed by: STUDENT IN AN ORGANIZED HEALTH CARE EDUCATION/TRAINING PROGRAM

## 2019-03-04 PROCEDURE — 40000170 ZZH STATISTIC PRE-PROCEDURE ASSESSMENT II: Performed by: OTOLARYNGOLOGY

## 2019-03-04 PROCEDURE — 25800030 ZZH RX IP 258 OP 636: Performed by: NURSE ANESTHETIST, CERTIFIED REGISTERED

## 2019-03-04 PROCEDURE — 25000125 ZZHC RX 250: Performed by: OTOLARYNGOLOGY

## 2019-03-04 PROCEDURE — 27210794 ZZH OR GENERAL SUPPLY STERILE: Performed by: OTOLARYNGOLOGY

## 2019-03-04 PROCEDURE — 25000132 ZZH RX MED GY IP 250 OP 250 PS 637: Performed by: NURSE ANESTHETIST, CERTIFIED REGISTERED

## 2019-03-04 PROCEDURE — 37000009 ZZH ANESTHESIA TECHNICAL FEE, EACH ADDTL 15 MIN: Performed by: OTOLARYNGOLOGY

## 2019-03-04 PROCEDURE — 25000125 ZZHC RX 250: Performed by: NURSE ANESTHETIST, CERTIFIED REGISTERED

## 2019-03-04 PROCEDURE — 00000146 ZZHCL STATISTIC GLUCOSE BY METER IP

## 2019-03-04 PROCEDURE — 25000125 ZZHC RX 250: Performed by: NURSE PRACTITIONER

## 2019-03-04 PROCEDURE — 25800030 ZZH RX IP 258 OP 636: Performed by: ANESTHESIOLOGY

## 2019-03-04 PROCEDURE — 36000064 ZZH SURGERY LEVEL 4 EA 15 ADDTL MIN - UMMC: Performed by: OTOLARYNGOLOGY

## 2019-03-04 PROCEDURE — 83970 ASSAY OF PARATHORMONE: CPT | Performed by: STUDENT IN AN ORGANIZED HEALTH CARE EDUCATION/TRAINING PROGRAM

## 2019-03-04 PROCEDURE — G0378 HOSPITAL OBSERVATION PER HR: HCPCS

## 2019-03-04 PROCEDURE — 81025 URINE PREGNANCY TEST: CPT | Performed by: ANESTHESIOLOGY

## 2019-03-04 PROCEDURE — 36415 COLL VENOUS BLD VENIPUNCTURE: CPT | Performed by: STUDENT IN AN ORGANIZED HEALTH CARE EDUCATION/TRAINING PROGRAM

## 2019-03-04 PROCEDURE — 25000132 ZZH RX MED GY IP 250 OP 250 PS 637: Performed by: STUDENT IN AN ORGANIZED HEALTH CARE EDUCATION/TRAINING PROGRAM

## 2019-03-04 PROCEDURE — 80048 BASIC METABOLIC PNL TOTAL CA: CPT | Performed by: STUDENT IN AN ORGANIZED HEALTH CARE EDUCATION/TRAINING PROGRAM

## 2019-03-04 PROCEDURE — 82330 ASSAY OF CALCIUM: CPT | Performed by: STUDENT IN AN ORGANIZED HEALTH CARE EDUCATION/TRAINING PROGRAM

## 2019-03-04 PROCEDURE — 71000014 ZZH RECOVERY PHASE 1 LEVEL 2 FIRST HR: Performed by: OTOLARYNGOLOGY

## 2019-03-04 PROCEDURE — 71000015 ZZH RECOVERY PHASE 1 LEVEL 2 EA ADDTL HR: Performed by: OTOLARYNGOLOGY

## 2019-03-04 RX ORDER — ONDANSETRON 2 MG/ML
INJECTION INTRAMUSCULAR; INTRAVENOUS PRN
Status: DISCONTINUED | OUTPATIENT
Start: 2019-03-04 | End: 2019-03-04

## 2019-03-04 RX ORDER — LIDOCAINE HYDROCHLORIDE 20 MG/ML
INJECTION, SOLUTION INFILTRATION; PERINEURAL PRN
Status: DISCONTINUED | OUTPATIENT
Start: 2019-03-04 | End: 2019-03-04

## 2019-03-04 RX ORDER — LABETALOL HYDROCHLORIDE 5 MG/ML
10 INJECTION, SOLUTION INTRAVENOUS
Status: DISCONTINUED | OUTPATIENT
Start: 2019-03-04 | End: 2019-03-04 | Stop reason: HOSPADM

## 2019-03-04 RX ORDER — NALOXONE HYDROCHLORIDE 0.4 MG/ML
.1-.4 INJECTION, SOLUTION INTRAMUSCULAR; INTRAVENOUS; SUBCUTANEOUS
Status: DISCONTINUED | OUTPATIENT
Start: 2019-03-04 | End: 2019-03-04 | Stop reason: HOSPADM

## 2019-03-04 RX ORDER — GLYCOPYRROLATE 0.2 MG/ML
INJECTION, SOLUTION INTRAMUSCULAR; INTRAVENOUS PRN
Status: DISCONTINUED | OUTPATIENT
Start: 2019-03-04 | End: 2019-03-04

## 2019-03-04 RX ORDER — ONDANSETRON 2 MG/ML
4 INJECTION INTRAMUSCULAR; INTRAVENOUS EVERY 6 HOURS PRN
Status: DISCONTINUED | OUTPATIENT
Start: 2019-03-04 | End: 2019-03-04 | Stop reason: HOSPADM

## 2019-03-04 RX ORDER — DEXAMETHASONE SODIUM PHOSPHATE 10 MG/ML
INJECTION, SOLUTION INTRAMUSCULAR; INTRAVENOUS PRN
Status: DISCONTINUED | OUTPATIENT
Start: 2019-03-04 | End: 2019-03-04

## 2019-03-04 RX ORDER — SODIUM CHLORIDE, SODIUM LACTATE, POTASSIUM CHLORIDE, CALCIUM CHLORIDE 600; 310; 30; 20 MG/100ML; MG/100ML; MG/100ML; MG/100ML
INJECTION, SOLUTION INTRAVENOUS CONTINUOUS
Status: DISCONTINUED | OUTPATIENT
Start: 2019-03-04 | End: 2019-03-04 | Stop reason: HOSPADM

## 2019-03-04 RX ORDER — AMOXICILLIN 250 MG
1-2 CAPSULE ORAL 2 TIMES DAILY
Qty: 30 TABLET | Refills: 0 | Status: SHIPPED | OUTPATIENT
Start: 2019-03-04 | End: 2020-07-01

## 2019-03-04 RX ORDER — PROPOFOL 10 MG/ML
INJECTION, EMULSION INTRAVENOUS PRN
Status: DISCONTINUED | OUTPATIENT
Start: 2019-03-04 | End: 2019-03-04

## 2019-03-04 RX ORDER — ONDANSETRON 2 MG/ML
4 INJECTION INTRAMUSCULAR; INTRAVENOUS EVERY 30 MIN PRN
Status: DISCONTINUED | OUTPATIENT
Start: 2019-03-04 | End: 2019-03-04 | Stop reason: HOSPADM

## 2019-03-04 RX ORDER — DIMENHYDRINATE 50 MG/ML
25 INJECTION, SOLUTION INTRAMUSCULAR; INTRAVENOUS
Status: DISCONTINUED | OUTPATIENT
Start: 2019-03-04 | End: 2019-03-04 | Stop reason: HOSPADM

## 2019-03-04 RX ORDER — CEFAZOLIN SODIUM 1 G/3ML
INJECTION, POWDER, FOR SOLUTION INTRAMUSCULAR; INTRAVENOUS PRN
Status: DISCONTINUED | OUTPATIENT
Start: 2019-03-04 | End: 2019-03-04

## 2019-03-04 RX ORDER — OXYCODONE AND ACETAMINOPHEN 5; 325 MG/1; MG/1
1 TABLET ORAL EVERY 6 HOURS PRN
Qty: 12 TABLET | Refills: 0 | Status: SHIPPED | OUTPATIENT
Start: 2019-03-04 | End: 2019-03-07

## 2019-03-04 RX ORDER — METHIMAZOLE 5 MG/1
5 TABLET ORAL DAILY
Status: DISCONTINUED | OUTPATIENT
Start: 2019-03-04 | End: 2019-03-04 | Stop reason: HOSPADM

## 2019-03-04 RX ORDER — CYCLOBENZAPRINE HCL 5 MG
5 TABLET ORAL 3 TIMES DAILY PRN
Status: DISCONTINUED | OUTPATIENT
Start: 2019-03-04 | End: 2019-03-04 | Stop reason: HOSPADM

## 2019-03-04 RX ORDER — HYDROMORPHONE HYDROCHLORIDE 1 MG/ML
.3-.5 INJECTION, SOLUTION INTRAMUSCULAR; INTRAVENOUS; SUBCUTANEOUS EVERY 5 MIN PRN
Status: DISCONTINUED | OUTPATIENT
Start: 2019-03-04 | End: 2019-03-04 | Stop reason: HOSPADM

## 2019-03-04 RX ORDER — OXYCODONE HYDROCHLORIDE 5 MG/1
5-10 TABLET ORAL EVERY 4 HOURS PRN
Qty: 15 TABLET | Refills: 0 | Status: SHIPPED | OUTPATIENT
Start: 2019-03-04 | End: 2019-03-04

## 2019-03-04 RX ORDER — ACETAMINOPHEN 325 MG/1
650 TABLET ORAL EVERY 4 HOURS PRN
Qty: 30 TABLET | Refills: 0 | Status: SHIPPED | OUTPATIENT
Start: 2019-03-04

## 2019-03-04 RX ORDER — PHENOBARBITAL 32.4 MG/1
32.4 TABLET ORAL 2 TIMES DAILY
Status: DISCONTINUED | OUTPATIENT
Start: 2019-03-04 | End: 2019-03-04 | Stop reason: HOSPADM

## 2019-03-04 RX ORDER — HYDROMORPHONE HYDROCHLORIDE 1 MG/ML
0.2 INJECTION, SOLUTION INTRAMUSCULAR; INTRAVENOUS; SUBCUTANEOUS
Status: DISCONTINUED | OUTPATIENT
Start: 2019-03-04 | End: 2019-03-04 | Stop reason: HOSPADM

## 2019-03-04 RX ORDER — ACETAMINOPHEN 325 MG/1
650 TABLET ORAL ONCE
Status: COMPLETED | OUTPATIENT
Start: 2019-03-04 | End: 2019-03-04

## 2019-03-04 RX ORDER — LIDOCAINE 40 MG/G
CREAM TOPICAL
Status: DISCONTINUED | OUTPATIENT
Start: 2019-03-04 | End: 2019-03-04 | Stop reason: HOSPADM

## 2019-03-04 RX ORDER — OXYCODONE HCL 5 MG/5 ML
5 SOLUTION, ORAL ORAL ONCE
Status: COMPLETED | OUTPATIENT
Start: 2019-03-04 | End: 2019-03-04

## 2019-03-04 RX ORDER — PROPRANOLOL HYDROCHLORIDE 80 MG/1
80 CAPSULE, EXTENDED RELEASE ORAL AT BEDTIME
Status: DISCONTINUED | OUTPATIENT
Start: 2019-03-04 | End: 2019-03-04 | Stop reason: HOSPADM

## 2019-03-04 RX ORDER — SCOLOPAMINE TRANSDERMAL SYSTEM 1 MG/1
1 PATCH, EXTENDED RELEASE TRANSDERMAL ONCE
Status: COMPLETED | OUTPATIENT
Start: 2019-03-04 | End: 2019-03-04

## 2019-03-04 RX ORDER — ONDANSETRON 4 MG/1
4 TABLET, ORALLY DISINTEGRATING ORAL EVERY 6 HOURS PRN
Status: DISCONTINUED | OUTPATIENT
Start: 2019-03-04 | End: 2019-03-04 | Stop reason: HOSPADM

## 2019-03-04 RX ORDER — LIDOCAINE HYDROCHLORIDE AND EPINEPHRINE 10; 10 MG/ML; UG/ML
INJECTION, SOLUTION INFILTRATION; PERINEURAL PRN
Status: DISCONTINUED | OUTPATIENT
Start: 2019-03-04 | End: 2019-03-04 | Stop reason: HOSPADM

## 2019-03-04 RX ORDER — ACETAMINOPHEN 325 MG/1
650 TABLET ORAL EVERY 6 HOURS PRN
Status: DISCONTINUED | OUTPATIENT
Start: 2019-03-04 | End: 2019-03-04 | Stop reason: HOSPADM

## 2019-03-04 RX ORDER — ACETAMINOPHEN 325 MG/1
325 TABLET ORAL ONCE
Status: COMPLETED | OUTPATIENT
Start: 2019-03-04 | End: 2019-03-04

## 2019-03-04 RX ORDER — FENTANYL CITRATE 50 UG/ML
25-50 INJECTION, SOLUTION INTRAMUSCULAR; INTRAVENOUS
Status: DISCONTINUED | OUTPATIENT
Start: 2019-03-04 | End: 2019-03-04 | Stop reason: HOSPADM

## 2019-03-04 RX ORDER — PHENOBARBITAL 30 MG/1
30 TABLET ORAL 2 TIMES DAILY
Status: DISCONTINUED | OUTPATIENT
Start: 2019-03-04 | End: 2019-03-04

## 2019-03-04 RX ORDER — AMOXICILLIN 250 MG
2 CAPSULE ORAL AT BEDTIME
Status: DISCONTINUED | OUTPATIENT
Start: 2019-03-04 | End: 2019-03-04 | Stop reason: HOSPADM

## 2019-03-04 RX ORDER — OXYCODONE HYDROCHLORIDE 5 MG/1
5-10 TABLET ORAL
Status: DISCONTINUED | OUTPATIENT
Start: 2019-03-04 | End: 2019-03-04 | Stop reason: HOSPADM

## 2019-03-04 RX ORDER — FENTANYL CITRATE 50 UG/ML
INJECTION, SOLUTION INTRAMUSCULAR; INTRAVENOUS PRN
Status: DISCONTINUED | OUTPATIENT
Start: 2019-03-04 | End: 2019-03-04

## 2019-03-04 RX ORDER — CARBAMAZEPINE 400 MG/1
400 TABLET, EXTENDED RELEASE ORAL 2 TIMES DAILY
Status: DISCONTINUED | OUTPATIENT
Start: 2019-03-04 | End: 2019-03-04 | Stop reason: HOSPADM

## 2019-03-04 RX ORDER — ONDANSETRON 4 MG/1
4 TABLET, ORALLY DISINTEGRATING ORAL EVERY 30 MIN PRN
Status: DISCONTINUED | OUTPATIENT
Start: 2019-03-04 | End: 2019-03-04 | Stop reason: HOSPADM

## 2019-03-04 RX ORDER — MINERAL OIL/HYDROPHIL PETROLAT
OINTMENT (GRAM) TOPICAL PRN
Qty: 50 G | Refills: 0 | Status: SHIPPED | OUTPATIENT
Start: 2019-03-04 | End: 2020-07-01

## 2019-03-04 RX ORDER — SODIUM CHLORIDE, SODIUM LACTATE, POTASSIUM CHLORIDE, CALCIUM CHLORIDE 600; 310; 30; 20 MG/100ML; MG/100ML; MG/100ML; MG/100ML
INJECTION, SOLUTION INTRAVENOUS CONTINUOUS PRN
Status: DISCONTINUED | OUTPATIENT
Start: 2019-03-04 | End: 2019-03-04

## 2019-03-04 RX ORDER — CLINDAMYCIN PHOSPHATE 600 MG/50ML
600 INJECTION, SOLUTION INTRAVENOUS
Status: DISCONTINUED | OUTPATIENT
Start: 2019-03-04 | End: 2019-03-04 | Stop reason: HOSPADM

## 2019-03-04 RX ADMIN — ONDANSETRON 4 MG: 2 INJECTION INTRAMUSCULAR; INTRAVENOUS at 12:53

## 2019-03-04 RX ADMIN — FENTANYL CITRATE 100 MCG: 50 INJECTION, SOLUTION INTRAMUSCULAR; INTRAVENOUS at 09:14

## 2019-03-04 RX ADMIN — SODIUM CHLORIDE, POTASSIUM CHLORIDE, SODIUM LACTATE AND CALCIUM CHLORIDE: 600; 310; 30; 20 INJECTION, SOLUTION INTRAVENOUS at 09:00

## 2019-03-04 RX ADMIN — PROPOFOL 50 MG: 10 INJECTION, EMULSION INTRAVENOUS at 09:18

## 2019-03-04 RX ADMIN — PHENYLEPHRINE HYDROCHLORIDE 100 MCG: 10 INJECTION, SOLUTION INTRAMUSCULAR; INTRAVENOUS; SUBCUTANEOUS at 09:53

## 2019-03-04 RX ADMIN — CEFAZOLIN 1 G: 1 INJECTION, POWDER, FOR SOLUTION INTRAMUSCULAR; INTRAVENOUS at 11:45

## 2019-03-04 RX ADMIN — PROPOFOL 100 MG: 10 INJECTION, EMULSION INTRAVENOUS at 09:14

## 2019-03-04 RX ADMIN — OXYCODONE HYDROCHLORIDE 5 MG: 5 TABLET ORAL at 16:12

## 2019-03-04 RX ADMIN — PHENYLEPHRINE HYDROCHLORIDE 100 MCG: 10 INJECTION, SOLUTION INTRAMUSCULAR; INTRAVENOUS; SUBCUTANEOUS at 09:37

## 2019-03-04 RX ADMIN — PHENYLEPHRINE HYDROCHLORIDE 50 MCG: 10 INJECTION, SOLUTION INTRAMUSCULAR; INTRAVENOUS; SUBCUTANEOUS at 10:08

## 2019-03-04 RX ADMIN — GLYCOPYRROLATE 0.1 MG: 0.2 INJECTION, SOLUTION INTRAMUSCULAR; INTRAVENOUS at 10:41

## 2019-03-04 RX ADMIN — ACETAMINOPHEN 650 MG: 325 TABLET, FILM COATED ORAL at 08:15

## 2019-03-04 RX ADMIN — PHENYLEPHRINE HYDROCHLORIDE 100 MCG: 10 INJECTION, SOLUTION INTRAMUSCULAR; INTRAVENOUS; SUBCUTANEOUS at 10:16

## 2019-03-04 RX ADMIN — ONDANSETRON 4 MG: 2 INJECTION INTRAMUSCULAR; INTRAVENOUS at 12:14

## 2019-03-04 RX ADMIN — GLYCOPYRROLATE 0.2 MG: 0.2 INJECTION, SOLUTION INTRAMUSCULAR; INTRAVENOUS at 09:13

## 2019-03-04 RX ADMIN — HYDROMORPHONE HYDROCHLORIDE 0.5 MG: 1 INJECTION, SOLUTION INTRAMUSCULAR; INTRAVENOUS; SUBCUTANEOUS at 12:22

## 2019-03-04 RX ADMIN — OXYCODONE HYDROCHLORIDE 5 MG: 5 SOLUTION ORAL at 13:59

## 2019-03-04 RX ADMIN — REMIFENTANIL HYDROCHLORIDE 0.06 MCG/KG/MIN: 1 INJECTION, POWDER, LYOPHILIZED, FOR SOLUTION INTRAVENOUS at 09:25

## 2019-03-04 RX ADMIN — DEXAMETHASONE SODIUM PHOSPHATE 6 MG: 10 INJECTION, SOLUTION INTRAMUSCULAR; INTRAVENOUS at 09:44

## 2019-03-04 RX ADMIN — PHENYLEPHRINE HYDROCHLORIDE 50 MCG: 10 INJECTION, SOLUTION INTRAMUSCULAR; INTRAVENOUS; SUBCUTANEOUS at 10:01

## 2019-03-04 RX ADMIN — METHIMAZOLE 5 MG: 5 TABLET ORAL at 08:15

## 2019-03-04 RX ADMIN — PHENYLEPHRINE HYDROCHLORIDE 50 MCG: 10 INJECTION, SOLUTION INTRAMUSCULAR; INTRAVENOUS; SUBCUTANEOUS at 10:39

## 2019-03-04 RX ADMIN — LIDOCAINE HYDROCHLORIDE 50 MG: 20 INJECTION, SOLUTION INFILTRATION; PERINEURAL at 09:14

## 2019-03-04 RX ADMIN — SCOPOLAMINE 1 PATCH: 1 PATCH, EXTENDED RELEASE TRANSDERMAL at 07:53

## 2019-03-04 RX ADMIN — POLYETHYLENE GLYCOL 400 AND PROPYLENE GLYCOL 2 DROP: 4; 3 SOLUTION/ DROPS OPHTHALMIC at 09:16

## 2019-03-04 RX ADMIN — PHENYLEPHRINE HYDROCHLORIDE 50 MCG: 10 INJECTION, SOLUTION INTRAMUSCULAR; INTRAVENOUS; SUBCUTANEOUS at 10:27

## 2019-03-04 RX ADMIN — PROPOFOL 50 MG: 10 INJECTION, EMULSION INTRAVENOUS at 09:16

## 2019-03-04 RX ADMIN — ACETAMINOPHEN 650 MG: 325 TABLET, FILM COATED ORAL at 13:59

## 2019-03-04 RX ADMIN — ROCURONIUM BROMIDE 10 MG: 10 INJECTION INTRAVENOUS at 09:17

## 2019-03-04 RX ADMIN — MIDAZOLAM 1 MG: 1 INJECTION INTRAMUSCULAR; INTRAVENOUS at 09:04

## 2019-03-04 RX ADMIN — SODIUM CHLORIDE, POTASSIUM CHLORIDE, SODIUM LACTATE AND CALCIUM CHLORIDE: 600; 310; 30; 20 INJECTION, SOLUTION INTRAVENOUS at 09:27

## 2019-03-04 RX ADMIN — CEFAZOLIN 2 G: 1 INJECTION, POWDER, FOR SOLUTION INTRAMUSCULAR; INTRAVENOUS at 09:45

## 2019-03-04 ASSESSMENT — MIFFLIN-ST. JEOR: SCORE: 1290.38

## 2019-03-04 ASSESSMENT — PAIN DESCRIPTION - DESCRIPTORS: DESCRIPTORS: ACHING

## 2019-03-04 NOTE — ANESTHESIA CARE TRANSFER NOTE
Patient: Chayo Chang    Procedure(s):  Left Hemithyroidectomy And Isthmusectomy    Diagnosis: Thyroid Nodule   Diagnosis Additional Information: No value filed.    Anesthesia Type:   No value filed.     Note:  Airway :Face Mask  Patient transferred to:PACU  Comments: 4L facemask. Alert and exchanging well. VSS. Report given to RN. Handoff Report: Identifed the Patient, Identified the Reponsible Provider, Reviewed the pertinent medical history, Discussed the surgical course, Reviewed Intra-OP anesthesia mangement and issues during anesthesia, Set expectations for post-procedure period and Allowed opportunity for questions and acknowledgement of understanding      Vitals: (Last set prior to Anesthesia Care Transfer)    CRNA VITALS  3/4/2019 1206 - 3/4/2019 1242      3/4/2019             NIBP:  128/94  (Abnormal)     Pulse:  95    NIBP Mean:  113    Ht Rate:  95    SpO2:  100 %                Electronically Signed By: ROMÁN Villela CRNA  March 4, 2019  12:42 PM

## 2019-03-04 NOTE — ANESTHESIA POSTPROCEDURE EVALUATION
Anesthesia POST Procedure Evaluation    Patient: Chayo Chang   MRN:     [id number] Gender:   female   Age:    49 year old :      1970        Preoperative Diagnosis: Thyroid Nodule    Procedure(s):  Left Hemithyroidectomy And Isthmusectomy   Postop Comments: No value filed.       Anesthesia Type:  General    Reportable Event: NO     PAIN: Uncomplicated   Sign Out status: Comfortable, Well controlled pain     PONV: No PONV   Sign Out status:  No Nausea or Vomiting     Neuro/Psych: Uneventful perioperative course   Sign Out Status: Preoperative baseline; Age appropriate mentation     Airway/Resp.: Uneventful perioperative course   Sign Out Status: Non labored breathing, age appropriate RR; Resp. Status within EXPECTED Parameters     CV: Uneventful perioperative course   Sign Out status: Appropriate BP and perfusion indices; Appropriate HR/Rhythm     Disposition:   Sign Out in:  PACU  Disposition:  Phase II; Home  Recovery Course: Uneventful  Follow-Up: Not required     Comments/Narrative:  Patient awake to voice, clear a/w.  No excess neck swelling.... Minimal blood drain.  Stable VS.  No new or current issues evident at this time.  OK out per PACU protocol.  --rcp             Last Anesthesia Record Vitals:  CRNA VITALS  3/4/2019 1206 - 3/4/2019 1306      3/4/2019             NIBP:  128/94  (Abnormal)     Pulse:  95    NIBP Mean:  113    Ht Rate:  95    SpO2:  100 %          Last PACU/Preop Vitals:  Vitals:    19 0716 19 1240 19 1300   BP: 124/68 (!) 138/94 137/84   Pulse: 66 93 90   Resp: 18 19 15   Temp: 36.7  C (98.1  F) 37.8  C (100  F)    SpO2: 99% 100% 99%         Electronically Signed By: Alexsander Guerra MD, 2019, 1:08 PM

## 2019-03-04 NOTE — PROGRESS NOTES
PRIMARY DIAGNOSIS: ACUTE PAIN  OUTPATIENT/OBSERVATION GOALS TO BE MET BEFORE DISCHARGE:  1. Pain Status: Improved-controlled with oral pain medications. Oxycodone 5mg given.     2. Return to near baseline physical activity: No, still needs to get out of bed, ambulate in room.     3. Cleared for discharge by consultants (if involved): Yes pending goals being met.     Discharge Planner Nurse   Safe discharge environment identified: Yes  Barriers to discharge: Yes - needs to void, get OOB, and tolerate a regular diet prior to discharge.        Entered by: Veronica Carroll 03/04/2019 4:17 PM

## 2019-03-04 NOTE — PROGRESS NOTES
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  Calcium 4.7, iPTH WNL.    Voice strong, cords mobile on FOE post-op.  Taking soup without aspriation.  Wound clean without hematoma.  CHRIS functioning.  As long as she tolerates solid food, can urinate, and is able to ambulate, we can discharge with f/u with me on Wednesday (two days)

## 2019-03-04 NOTE — BRIEF OP NOTE
[facility], [place]    Brief Operative Note    Pre-operative diagnosis: Thyroid Nodule   Post-operative diagnosis Same  Procedure: Procedure(s):  Left Hemithyroidectomy And Isthmusectomy  Surgeon: Surgeon(s) and Role:     * Otoniel Simon MD - Primary     * Fidelina Multani MD - Resident - Assisting  Anesthesia: General   Estimated blood loss: 50cc  Drains: Xander-Humphreys  Specimens:   ID Type Source Tests Collected by Time Destination   A : left hemithyroid and isthmus, stitched posterior lateral over surgically created rent Tissue Other SURGICAL PATHOLOGY EXAM Otoniel Simon MD 3/4/2019 12:02 PM      Findings:   See operative note for details. Left thyroid extended slightly into mediastinum. Nerve not identified but stimulating spontaneously at end of case. .  Complications: None.  Implants: None.

## 2019-03-04 NOTE — OP NOTE
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  She is medically suppressed but needs a more enduring solution.  Radioactive iodine was considered but due to the dose required, she has opted for a surgical excision.    Procedure:  After consent, the patient was brought to the operating room and placed in the supine position.  Following induction, the patient was intubated orotracheally.  Monitoring lines were placed as appropriate. The patient was intubated with a EMG endotracheal tube, and the leads were connected to the nerve integrity monitoring system.  Impedances were excellent.  The patient was left unturned, with the tube taped in the midline.  The head and neck were extended.  A 4 cm low neck incision was marked and infiltrated with 1% Xylocaine with 1:100,000 epinephrine solution in a skin crease.  The wound was prepped and draped in standard sterile fashion.      The incision was made, primarily on the left, and skin flaps were elevated.  The strap muscles were  in the midline and the thyroid was exposed. The left thyroid lobe was exposed with blunt dissection, using bipolar cautery to assure hemostasis.  The lobe was dissected in the capsular plane.  The upper pole was then freed, avoiding injury to the external branch of the superior laryngeal nerve.  The upper pole artery and veins were ligated and divided.  The lateral thyroid lobe was gently mobilized with retraction and blunt dissectoin.  Inferiorly, the lobe was bluntly dissected out of the  mediastinum.      The terminal branches of the inferior thyroid artery were divided as they entered the thyroid, to avoid injury to the parathyroid vascular supply. The specimen was divided at the isthmus to help with mobility.  We had difficulty gaining exposure given the large mass and small incision, and since the size of the mass would not allow it to be delivered, I extended the neck incision by 1 cm laterally, and divided the medial strap muscle.  Now, the lobe was dissected from lateral to medial, avoiding the course of the nerve as it entered the crico-thyroid joint.  The recurrent nerve was not seen during the dissection, and we stayed anterior to the joint.      Hemostasis was achieved with bipolar cautery and fine silk ligatures.  No parathyroids were seen during the dissection.  The large lobe was delivered.  Hemostasis was assured meticulously.  The left strap was re-approximated.   A #10 Xander-Humphreys drain was placed to provide evacuation of bleeding. The incisions were then closed with 3-0 vicryl in the platysma layer, and a running 5-0 nylon in the skin.  The patient tolerated the procedure well.  I was present for the entire procedure, including skin closure.  No complications were encountered.      Otoniel Simon MD  Otolaryngology/Head & Neck Surgery

## 2019-03-05 ENCOUNTER — PATIENT OUTREACH (OUTPATIENT)
Dept: CARE COORDINATION | Facility: CLINIC | Age: 49
End: 2019-03-05

## 2019-03-05 ENCOUNTER — TELEPHONE (OUTPATIENT)
Dept: ENDOCRINOLOGY | Facility: CLINIC | Age: 49
End: 2019-03-05

## 2019-03-05 DIAGNOSIS — E89.0 H/O PARTIAL THYROIDECTOMY: Primary | ICD-10-CM

## 2019-03-05 RX ORDER — LEVOTHYROXINE SODIUM 112 UG/1
112 TABLET ORAL DAILY
Qty: 90 TABLET | Refills: 3 | Status: SHIPPED | OUTPATIENT
Start: 2019-03-05

## 2019-03-05 NOTE — TELEPHONE ENCOUNTER
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   Tell her that I recommend she start levothyroxine 112 mcg/day on Friday this week.   I submitted Rx to the [facility].   Thanks  Ashley Brar MD

## 2019-03-05 NOTE — PROGRESS NOTES
PRIMARY DIAGNOSIS: ACUTE PAIN  OUTPATIENT/OBSERVATION GOALS TO BE MET BEFORE DISCHARGE:  1. Pain Status: Improved-controlled with oral pain medications. Oxycodone 5mg given.      2. Return to near baseline physical activity: yes, ambulating in room and to restroom.      3. Cleared for discharge by consultants (if involved): Yes, order placed.      Discharge Planner Nurse      Safe discharge environment identified: Yes  Barriers to discharge: No. Patient pain managed well with PRN oxy X1. Voided, ate full dinner, ambulating in room.     Pt discharged to: Home  Via: Personal vehicle   Accompanied by:   Belongings: Sent with patient  Teaching: Completed  Clinic appointment: Wednesday - Nurse to contact patient tomorrow (Tuesday 3/5) with time.

## 2019-03-05 NOTE — TELEPHONE ENCOUNTER
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 She will start Levothyroxine   112 mcg per day  Friday and will take in the AM on empty stomach  4 hours from calcium, iron or soy products.

## 2019-03-06 ENCOUNTER — APPOINTMENT (OUTPATIENT)
Dept: OTOLARYNGOLOGY | Facility: CLINIC | Age: 49
End: 2019-03-06
Payer: COMMERCIAL

## 2019-03-07 LAB — COPATH REPORT: NORMAL

## 2019-03-13 ENCOUNTER — OFFICE VISIT (OUTPATIENT)
Dept: OTOLARYNGOLOGY | Facility: CLINIC | Age: 49
End: 2019-03-13
Payer: COMMERCIAL

## 2019-03-13 VITALS — BODY MASS INDEX: 30.4 KG/M2 | HEIGHT: 61 IN | WEIGHT: 161 LBS

## 2019-03-13 DIAGNOSIS — E05.90 HYPERTHYROIDISM: Primary | ICD-10-CM

## 2019-03-13 ASSESSMENT — MIFFLIN-ST. JEOR: SCORE: 1287.67

## 2019-03-13 ASSESSMENT — PAIN SCALES - GENERAL: PAINLEVEL: NO PAIN (1)

## 2019-03-13 NOTE — NURSING NOTE
Chief Complaint   Patient presents with     RECHECK     post op left thyroid 3/4     Jaime Islas, EMT

## 2019-03-13 NOTE — PROGRESS NOTES
March 13, 2019      HISTORY OF PRESENT ILLNESS:   Charlene is one week status post isthmusectomy and a left thyroid lobectomy for hyperplastic goiter.  She had a very small residual right hemithyroid that was left undisturbed.  Dr. Brar started her almost immediately on Synthroid postoperatively.      Chayo feels tired.  However, her voice Is strong.  She is eating without aspiration. She is feeling better and is excited about getting back to work.  We previously reviewed her pathology, which showed no sign of cancer.     PHYSICAL EXAMINATION:  Her voice is strong.  The sutures removed by Dorothy earlier this morning.  Her incision has some erythema in the area which is consistent with the skin irritation from the sutures.  However, there is no evidence of cellulitic changes.  There is some tenderness there as would be expected.  There is no fluid collection.      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  She is following closely with Dr. Brar to manage her thyroid metabolism.  I am obviously available to her at any time.      She understands to protect her skin as much as possible from the sun.  She can resume all activities except for heavy lifting.  She will start using vitamin E ointment as well.      BY/ms   Otoniel Simon MD  Otolaryngology/Head & Neck Surgery  [phone]                    Alexander Willett MD  [place] [id number]  [place] [address]    Ashley Brar MD  [facility] Endocrinology  [address]  [place] [address]

## 2019-03-13 NOTE — LETTER
3/13/2019       RE: Chayo Chang  [address]  [place] [address]     Dear Colleague,    Thank you for referring your patient, Chayo Chang, to the [facility] EAR NOSE AND THROAT at [facility]. Please see a copy of my visit note below.    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  She had a very small residual right hemithyroid that was left undisturbed.  Dr. Brar started her almost immediately on Synthroid postoperatively.      Chayo feels tired.  However, her voice Is strong.  She is eating without aspiration. She is feeling better and is excited about getting back to work.  We previously reviewed her pathology, which showed no sign of cancer.     PHYSICAL EXAMINATION:  Her voice is strong.  The sutures removed by Dorothy earlier this morning.  Her incision has some erythema in the area which is consistent with the skin irritation from the sutures.  However, there is no evidence of cellulitic changes.  There is some tenderness there as would be expected.  There is no fluid collection.      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  She is following closely with Dr. Brar to manage her thyroid metabolism.  I am obviously available to her at any time.      She understands to protect her skin as much as possible from the sun.  She can resume all activities except for heavy lifting.  She will start using vitamin E ointment as well.      BY/ms   Otoniel Simon MD  Otolaryngology/Head & Neck Surgery  [phone]                    Alexander Willett MD  [facility]  [address]  [place] [address]    Ashley Brar MD  [facility] Endocrinology  [address]  [place] [address]

## 2019-03-13 NOTE — PATIENT INSTRUCTIONS
1. Please follow-up in clinic as needed.  2. Please call the ENT clinic with any questions,concerns, new or worsening symptoms.    -Clinic number is [phone]

## 2019-03-20 NOTE — DISCHARGE SUMMARY
Discharge Summary  Chayo Chang  [id number]  1970    Date of Admission: 3/4/2019  Date of Discharge: 3/4/2019    Admission Diagnosis: Thyroid Nodule   Functional thyroid nodule  Discharge Diagnosis: Same    Procedures:  Date:   Procedure(s):  Left Hemithyroidectomy And Isthmusectomy    Pathology:   FINAL DIAGNOSIS:   THYROID GLAND, LEFT LOBE AND ISTHMUS, HEMITHYROIDECTOMY AND ISTHMUSECTOMY:   - Benign adenomatoid nodule, 5.2 cm in greatest dimension.   - Biopsy site changes.   - No evidence of malignancy identified.     HPI: Chayo Chang is a 49 year old female with history of a toxic thyroid adenoma. She has been medically suppressed but requires a more permanent solution at this time. The dose of radioactive iodine that would be required to treat her is quite high, so surgical options were explored.     It was recommended that she undergo operative intervention and the patient consented to the above procedure after detailed explanation of the risks and benefits of said procedure.    Hospital Course: The patient was admitted to the hospital and underwent the above mentioned procedure. She tolerated the procedure without any intra- or amy-operative complications. Please see the operative report for full details of the procedure. The patient was admitted for post-operative observation and obtaining labs. Her postoperative course was uneventful. At discharge, the patient's pain was well controlled, the patient was voiding on her own, and was ambulating and tolerating a regular diet.     Discharge Exam:  Vitals:    03/04/19 1400 03/04/19 1414 03/04/19 1415 03/04/19 1552   BP: 117/73  101/87 113/72   BP Location:    Right arm   Pulse: 83   83   Resp: 16   16   Temp:    99  F (37.2  C)   TempSrc:    Oral   SpO2: 99% 97% 98% 96%   Weight:       Height:           General: A&O x 3, No acute distress  HEENT: Face symmetric, neck soft and flat with no evidence of fluid collection or hematoma.   Respiratory:  Breathing non-labored on room air, no stridor, no accessory muscle use.     Discharge Medications:   Chayo Chang   Home Medication Instructions KIRA:[id number]    Printed on:03/20/19 1127   Medication Information                      acetaminophen (TYLENOL) 325 MG tablet  Take 325-650 mg by mouth every 6 hours as needed             acetaminophen (TYLENOL) 325 MG tablet  Take 2 tablets (650 mg) by mouth every 4 hours as needed for other (mild pain)             carBAMazepine (TEGRETOL XR) 400 MG 12 hr tablet  Take 400 mg by mouth 2 times daily             Cyclobenzaprine HCl (FLEXERIL PO)  Take 5 mg by mouth 3 times daily as needed for muscle spasms             ibuprofen (IBUPROFEN) 200 MG tablet  Take 1 tablet (200 mg) by mouth every 4 hours as needed             mineral oil-hydrophilic petrolatum (AQUAPHOR) external ointment  Apply topically as needed Use PRN to keep incision moist             PHENobarbital (LUMINAL) 30 MG tablet  Take 30 mg by mouth 2 times daily             propranolol (INDERAL LA) 80 MG capsule  Take by mouth At Bedtime              senna-docusate (SENOKOT-S/PERICOLACE) 8.6-50 MG tablet  Take 1-2 tablets by mouth 2 times daily             SUMAtriptan (IMITREX) 100 MG tablet  Take by mouth at onset of headache                 Discharge Procedure Orders   NO lifting   Order Comments: NO lifting over  10  lbs and no strenuous physical activity for  3  weeks.     Discharge Instructions - diet   Order Comments: Resume pre procedure diet.     Do not immerse incision in water    Order Comments: until the drain is removed. Once the drain is removed, you may let soapy water wash over the wound.       Dispo: To home in good condition. All of the patient's questions/concerns have been addressed at this time.     Fidelina Multani MD  Otolaryngology-Head & Neck Surgery PGY3  Please contact ENT by dialing [phone] and entering job code [phone].      ADDENDUM:  I saw Ms. Chang before discharge and was  involved with discharge planning.  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  I agree with Dr. Multani's assessment and plan.        Otoniel Simon MD  Otolaryngology/Head & Neck Surgery  [phone]

## 2019-04-08 ENCOUNTER — OFFICE VISIT (OUTPATIENT)
Dept: ENDOCRINOLOGY | Facility: CLINIC | Age: 49
End: 2019-04-08
Payer: COMMERCIAL

## 2019-04-08 VITALS
DIASTOLIC BLOOD PRESSURE: 84 MMHG | BODY MASS INDEX: 31.25 KG/M2 | WEIGHT: 165.5 LBS | SYSTOLIC BLOOD PRESSURE: 128 MMHG | HEART RATE: 68 BPM | HEIGHT: 61 IN

## 2019-04-08 DIAGNOSIS — Z86.39 HISTORY OF HYPERTHYROIDISM: ICD-10-CM

## 2019-04-08 DIAGNOSIS — E89.0 H/O PARTIAL THYROIDECTOMY: Primary | ICD-10-CM

## 2019-04-08 PROBLEM — E04.1: Status: RESOLVED | Noted: 2019-03-04 | Resolved: 2019-04-08

## 2019-04-08 PROBLEM — E05.10 TOXIC ADENOMA: Status: RESOLVED | Noted: 2018-10-31 | Resolved: 2019-04-08

## 2019-04-08 ASSESSMENT — PAIN SCALES - GENERAL: PAINLEVEL: NO PAIN (0)

## 2019-04-08 ASSESSMENT — MIFFLIN-ST. JEOR: SCORE: 1308.08

## 2019-04-08 NOTE — NURSING NOTE
Chief Complaint   Patient presents with     RECHECK     post op follow up      Breanna Cevallos CMA

## 2019-04-08 NOTE — ASSESSMENT & PLAN NOTE
Path showed adenomatoid nodule.  She is at risk for hypothyroidism now due to the residual small /suppressed right lobe (which might recover with time)  She is currently on LT4 112 mcg/day -   TFTS today on current dose.  We will try to keep the TFTS normal and slowly try to taper the LT4 if possible without inducing hypothyroidism.

## 2019-04-08 NOTE — LETTER
4/8/2019       RE: Chayo Chang  [address]  [place] [address]     Dear Colleague,    Thank you for referring your patient, Chayo Chang, to the [facility] ENDOCRINOLOGY at [facility]. Please see a copy of my visit note below.    Endocrine Consult note    Attending Assessment/Plan :     History of hyperthyroidism  prior to the left thyroid lobectomy. We expect this has been corrected with the surgery      history of left thyroid lobectomy for treatment of 5.2 cm nodule with high iodine uptake and hyperthyroidism  Path showed adenomatoid nodule.  She is at risk for hypothyroidism now due to the residual small /suppressed right lobe (which might recover with time)  She is currently on LT4 112 mcg/day -   TFTS today on current dose.  We will try to keep the TFTS normal and slowly try to taper the LT4 if possible without inducing hypothyroidism.        Tremor - resolved    Ashley Brar MD        Chief complaint/ HISTORY OF PRESENT ILLNESS  Chayo returns to follow up on the postoperative care of the toxic right thyroid nodule.  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  Left neck mass was noted in 2018 by one of our colleagues.  Labs in June showed thyrotoxicosis, with high 123I uptake on the left only, complete right lobe suppression.  We treated with methimazole preoperatively . On 3/4/19 she underwent left hemithyroidectomy and isthmusectomy. The surgery ws uncomplicated. This was a same day operations.      She has now been on LT4 112 mcg/day since a few days post op.  Overall, she can't tell a significant difference.      We have the following lab data:   6/12/18: TSH < 0.01, free T4 1.97, TPO < 0.5, creatinine 0.65, Ca 9.7  7/25/18: TSH < 0.01, free T4 1.62  10/31/18 :TSH < 0.01, Free T4 1.93, T3 187, TSI < 1  1/8/19: TSH < 0.01, free T4 1.37, T3 111 on  MMI  2/15/19: TSH < 0.01, free T4 1.22, T3 103 on MMI  3/4/19:  Ca 7.9, iCa 4.7, creatinine 0.40     images   6/22/18 : DXA ([facility]) - lowest T-score -2 right total hip; lowest Z-score -1.7 right total hip;   6/25/18 thyroid US ([facility] ) we do not have this on PACS- reportedly shows 5 cm solid left nodule and 0.3 cm right nodule.    7/25/18 thyroid US-images reviewed on PACs incomplete study - presumably FNAB procedure   7/26/18 123I thyroid uptake/scan:images reviewed on PACS left high uptake ; right not seen . 24 hour uptake 40%    Family history is positive for Graves' in her mother.     REVIEW OF SYSTEMS  Feels about the same  Didn't like taking LT4 at night - was waking up early in the AM; now takes in the AM  No temperature  Cardiac: negative  Respiratory: negative  GI: negative  Tremor stopped 2 weeks after MMI      Past Medical History  Past Medical History:   Diagnosis Date     Hyperthyroidism 06/2018     Migraines      PONV (postoperative nausea and vomiting)      Seizures (H)      Thyroid nodule 06/2018    reason for appt     Past Surgical History:   Procedure Laterality Date     INJECT EPIDURAL LUMBAR / SACRAL SINGLE Left 5/31/2018    Procedure: INJECT EPIDURAL LUMBAR / SACRAL CONTINUAL OR INTERMITTENT;  Lumbar Epidural interlaminal epidural steroid injection left L3/4;  Surgeon: Joon Watkins MD;  [facility]     INJECT EPIDURAL TRANSFORAMINAL LUMBAR / SACRAL SINGLE Left 5/24/2018    Procedure: INJECT EPIDURAL TRANSFORAMINAL LUMBAR / SACRAL SINGLE;  Left transforaminal lumbar epidural injection L3L4;  Surgeon: Joon Watkins MD;  [facility]     RELEASE DEQUERVAINS WRIST Left 8/11/2016    Procedure: RELEASE DEQUERVAINS WRIST;  Surgeon: Deisi Lewis MD;  [facility]     SKIN INCISION      nodules removed from scalp in childhood      THYROIDECTOMY Left 3/4/2019    Procedure: Left Hemithyroidectomy And Isthmusectomy;  Surgeon: Otoniel Simon MD;  [facility]: [facility] OR     VASCULAR  "SURGERY       wisdom teeth       4 removed        Medications    Current Outpatient Medications   Medication Sig Dispense Refill     acetaminophen (TYLENOL) 325 MG tablet Take 2 tablets (650 mg) by mouth every 4 hours as needed for other (mild pain) 30 tablet 0     acetaminophen (TYLENOL) 325 MG tablet Take 325-650 mg by mouth every 6 hours as needed       carBAMazepine (TEGRETOL XR) 400 MG 12 hr tablet Take 400 mg by mouth 2 times daily       Cyclobenzaprine HCl (FLEXERIL PO) Take 5 mg by mouth 3 times daily as needed for muscle spasms       ibuprofen (IBUPROFEN) 200 MG tablet Take 1 tablet (200 mg) by mouth every 4 hours as needed 100 tablet 0     levothyroxine (SYNTHROID/LEVOTHROID) 112 MCG tablet Take 1 tablet (112 mcg) by mouth daily 90 tablet 3     mineral oil-hydrophilic petrolatum (AQUAPHOR) external ointment Apply topically as needed Use PRN to keep incision moist 50 g 0     PHENobarbital (LUMINAL) 30 MG tablet Take 30 mg by mouth 2 times daily       propranolol (INDERAL LA) 80 MG capsule Take by mouth At Bedtime        senna-docusate (SENOKOT-S/PERICOLACE) 8.6-50 MG tablet Take 1-2 tablets by mouth 2 times daily 30 tablet 0     SUMAtriptan (IMITREX) 100 MG tablet Take by mouth at onset of headache         Allergies  Allergies   Allergen Reactions     Ampicillin      Reacted as a child, unknown     Codeine Nausea and Vomiting     Family History  family history includes Asthma in her mother and another family member; Hyperthyroidism in her mother; Osteoporosis in an other family member; Thyroid Disease in an other family member.    Social History  Social History     Tobacco Use     Smoking status: Never Smoker     Smokeless tobacco: Never Used   Substance Use Topics     Alcohol use: Yes     Alcohol/week: 4.2 oz     Comment: occas     Drug use: No     Physical Exam  /84   Pulse 68   Ht 1.549 m (5' 1\")   Wt 75.1 kg (165 lb 8 oz)   BMI 31.27 kg/m     Body mass index is 31.27 kg/m .  GENERAL : pleasant " woman  In no apparent distress  SKIN: Normal color, normal temperature, texture.  No hirsutism, alopecia   EYES: PER,  No scleral icterus,  No proptosis, conjunctival redness, stare, retraction  NECK: surgical scar is healed, not easily visibl  RESP: Lungs clear to auscultation bilaterally  CARDIAC: Regular rate and rhythm, normal S1 S2, without murmurs, rubs or gallops    NEURO: awake, alert, responds appropriately to questions.    Moves all extremities; Gait normal.  No  tremor of the outstretched hand  EXTREMITIES: No clubbing, cyanosis or edema.    DATA REVIEW    ENDO THYROID LABS-[facility] Latest Ref Rng & Units 2/15/2019 1/8/2019   TSH 0.40 - 4.00 mU/L <0.01 (L) <0.01 (L)   T4 FREE 0.76 - 1.46 ng/dL 1.22 1.37   TRIIODOTHYRONINE(T3) 60 - 181 ng/dL 103 111   THYROID STIM IMMUNOG <=1.3 TSI index       [facility]   TSH 0.40 - 4.00 mU/L <0.01 (L)   T4 FREE 0.76 - 1.46 ng/dL 1.93 (H)   TRIIODOTHYRONINE(T3) 60 - 181 ng/dL 187 (H)   THYROID STIM IMMUNOG <=1.3 TSI index <1.0     Patient Name: AVE CHOE   MR#: [id number]   Specimen #: [id number]   Collected: 3/4/2019   Received: 3/4/2019   Reported: 3/7/2019 09:25   Ordering Phy(s): JC AGUILAR     For improved result formatting, select 'View Enhanced Report Format' under    Linked Documents section.     SPECIMEN(S):   Left courtney thyroid and isthmus     FINAL DIAGNOSIS:   THYROID GLAND, LEFT LOBE AND ISTHMUS, HEMITHYROIDECTOMY AND ISTHMUSECTOMY:   - Benign adenomatoid nodule, 5.2 cm in greatest dimension.   - Biopsy site changes.   - No evidence of malignancy identified.     I have personally reviewed all specimens and/or slides, including the   listed special stains, and used them   with my medical judgement to determine or confirm the final diagnosis.     Electronically signed out by:     Azam Medeiros M.D., [facility]     CLINICAL HISTORY:   Thyroid nodule, previous FNA biopsy benign.     GROSS:   The specimen is  "received in formalin with proper patient identification,   labeled \"left hemithyroid and   isthmus\".  It consists of a 30.3 g thyroidectomy specimen, consistent of   left hemithyroid which measures 5.2 x   4.1 x 2.5 cm.  The specimen is oriented by the surgeon with a stitch   denoting posterior lateral margin.     The outer surface is dark-tan, nodular with a firm rubbery consistency.     The anterior capsule is inked blue,   posterior capsule is inked black, isthmus inked orange and area of stitch   is inked yellow.  Upon sectioning,   there is a 5.2 x 4.1 x 2.5 cm irregular, non-encapsulated mass. It has a   variegated white-red-tan color and is   focally cystic and hemorrhagic. It diffusely involves the thyroid and   abuts all margins. No invasion or   calcifications are identified. No normal thyroid parenchyma or parathyroid    glands are identified.   Representative sections are taken as follows.     Summary of sections   A1 Superior pole, perpendicular sections   A2-A7 Mass, sections from superior to inferior   A8 Inferior pole, perpendicular sections     MICROSCOPIC:   Microscopic examination was performed.     The technical component of this testing was completed at the [facility], with the professional component performed    at the [facility], [address] [place] [phone] ([phone])     CPT Codes:   A: 83326-HC8     COLLECTION SITE:   Client: [facility]   Location: [facility] ()     Again, thank you for allowing me to participate in the care of your patient.      Sincerely,    Ashley Brar MD    "

## 2019-04-08 NOTE — PROGRESS NOTES
Endocrine Consult note    Attending Assessment/Plan :     History of hyperthyroidism  prior to the left thyroid lobectomy. We expect this has been corrected with the surgery      history of left thyroid lobectomy for treatment of 5.2 cm nodule with high iodine uptake and hyperthyroidism  Path showed adenomatoid nodule.  She is at risk for hypothyroidism now due to the residual small /suppressed right lobe (which might recover with time)  She is currently on LT4 112 mcg/day -   TFTS today on current dose.  We will try to keep the TFTS normal and slowly try to taper the LT4 if possible without inducing hypothyroidism.        Tremor - resolved    Ashley Brar MD        Chief complaint/ HISTORY OF PRESENT ILLNESS  Chayo returns to follow up on the postoperative care of the toxic right thyroid nodule.  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  Left neck mass was noted in 2018 by one of our colleagues.  Labs in June showed thyrotoxicosis, with high 123I uptake on the left only, complete right lobe suppression.  We treated with methimazole preoperatively . On 3/4/19 she underwent left hemithyroidectomy and isthmusectomy. The surgery ws uncomplicated. This was a same day operations.      She has now been on LT4 112 mcg/day since a few days post op.  Overall, she can't tell a significant difference.      We have the following lab data:   6/12/18: TSH < 0.01, free T4 1.97, TPO < 0.5, creatinine 0.65, Ca 9.7  7/25/18: TSH < 0.01, free T4 1.62  10/31/18 :TSH < 0.01, Free T4 1.93, T3 187, TSI < 1  1/8/19: TSH < 0.01, free T4 1.37, T3 111 on MMI  2/15/19: TSH < 0.01, free T4 1.22, T3 103 on MMI  3/4/19:  Ca 7.9, iCa 4.7, creatinine 0.40     images   6/22/18 : DXA ([facility]) - lowest T-score -2 right total hip; lowest Z-score -1.7 right total hip;   6/25/18 thyroid US ([facility] ) we do not have this on PACS- reportedly shows 5 cm solid left  nodule and 0.3 cm right nodule.    7/25/18 thyroid US-images reviewed on PACs incomplete study - presumably FNAB procedure   7/26/18 123I thyroid uptake/scan:images reviewed on PACS left high uptake ; right not seen . 24 hour uptake 40%    Family history is positive for Graves' in her mother.     REVIEW OF SYSTEMS  Feels about the same  Didn't like taking LT4 at night - was waking up early in the AM; now takes in the AM  No temperature  Cardiac: negative  Respiratory: negative  GI: negative  Tremor stopped 2 weeks after MMI      Past Medical History  Past Medical History:   Diagnosis Date     Hyperthyroidism 06/2018     Migraines      PONV (postoperative nausea and vomiting)      Seizures (H)      Thyroid nodule 06/2018    reason for appt     Past Surgical History:   Procedure Laterality Date     INJECT EPIDURAL LUMBAR / SACRAL SINGLE Left 5/31/2018    Procedure: INJECT EPIDURAL LUMBAR / SACRAL CONTINUAL OR INTERMITTENT;  Lumbar Epidural interlaminal epidural steroid injection left L3/4;  Surgeon: Joon Watkins MD;  [facility]     INJECT EPIDURAL TRANSFORAMINAL LUMBAR / SACRAL SINGLE Left 5/24/2018    Procedure: INJECT EPIDURAL TRANSFORAMINAL LUMBAR / SACRAL SINGLE;  Left transforaminal lumbar epidural injection L3L4;  Surgeon: Joon Watkins MD;  [facility]     RELEASE DEQUERVAINS WRIST Left 8/11/2016    Procedure: RELEASE DEQUERVAINS WRIST;  Surgeon: Deisi Lewis MD;  [facility]     SKIN INCISION      nodules removed from scalp in childhood      THYROIDECTOMY Left 3/4/2019    Procedure: Left Hemithyroidectomy And Isthmusectomy;  Surgeon: Otoniel Simon MD;  Location: [facility] OR     VASCULAR SURGERY       wisdom teeth       4 removed        Medications    Current Outpatient Medications   Medication Sig Dispense Refill     acetaminophen (TYLENOL) 325 MG tablet Take 2 tablets (650 mg) by mouth every 4 hours as needed for other (mild pain) 30 tablet 0     acetaminophen (TYLENOL) 325 MG  "tablet Take 325-650 mg by mouth every 6 hours as needed       carBAMazepine (TEGRETOL XR) 400 MG 12 hr tablet Take 400 mg by mouth 2 times daily       Cyclobenzaprine HCl (FLEXERIL PO) Take 5 mg by mouth 3 times daily as needed for muscle spasms       ibuprofen (IBUPROFEN) 200 MG tablet Take 1 tablet (200 mg) by mouth every 4 hours as needed 100 tablet 0     levothyroxine (SYNTHROID/LEVOTHROID) 112 MCG tablet Take 1 tablet (112 mcg) by mouth daily 90 tablet 3     mineral oil-hydrophilic petrolatum (AQUAPHOR) external ointment Apply topically as needed Use PRN to keep incision moist 50 g 0     PHENobarbital (LUMINAL) 30 MG tablet Take 30 mg by mouth 2 times daily       propranolol (INDERAL LA) 80 MG capsule Take by mouth At Bedtime        senna-docusate (SENOKOT-S/PERICOLACE) 8.6-50 MG tablet Take 1-2 tablets by mouth 2 times daily 30 tablet 0     SUMAtriptan (IMITREX) 100 MG tablet Take by mouth at onset of headache         Allergies  Allergies   Allergen Reactions     Ampicillin      Reacted as a child, unknown     Codeine Nausea and Vomiting     Family History  family history includes Asthma in her mother and another family member; Hyperthyroidism in her mother; Osteoporosis in an other family member; Thyroid Disease in an other family member.    Social History  Social History     Tobacco Use     Smoking status: Never Smoker     Smokeless tobacco: Never Used   Substance Use Topics     Alcohol use: Yes     Alcohol/week: 4.2 oz     Comment: occas     Drug use: No     Physical Exam  /84   Pulse 68   Ht 1.549 m (5' 1\")   Wt 75.1 kg (165 lb 8 oz)   BMI 31.27 kg/m    Body mass index is 31.27 kg/m .  GENERAL : pleasant woman  In no apparent distress  SKIN: Normal color, normal temperature, texture.  No hirsutism, alopecia   EYES: PER,  No scleral icterus,  No proptosis, conjunctival redness, stare, retraction  NECK: surgical scar is healed, not easily visibl  RESP: Lungs clear to auscultation " "bilaterally  CARDIAC: Regular rate and rhythm, normal S1 S2, without murmurs, rubs or gallops    NEURO: awake, alert, responds appropriately to questions.    Moves all extremities; Gait normal.  No  tremor of the outstretched hand  EXTREMITIES: No clubbing, cyanosis or edema.    DATA REVIEW    ENDO THYROID LABS-[facility] Latest Ref Rng & Units 2/15/2019 1/8/2019   TSH 0.40 - 4.00 mU/L <0.01 (L) <0.01 (L)   T4 FREE 0.76 - 1.46 ng/dL 1.22 1.37   TRIIODOTHYRONINE(T3) 60 - 181 ng/dL 103 111   THYROID STIM IMMUNOG <=1.3 TSI index       ENDO THYROID LABS-[facility] Latest Ref Rng & Units 10/31/2018   TSH 0.40 - 4.00 mU/L <0.01 (L)   T4 FREE 0.76 - 1.46 ng/dL 1.93 (H)   TRIIODOTHYRONINE(T3) 60 - 181 ng/dL 187 (H)   THYROID STIM IMMUNOG <=1.3 TSI index <1.0     Patient Name: AVE CHOE   MR#: [id number]   Specimen #: [id number]   Collected: 3/4/2019   Received: 3/4/2019   Reported: 3/7/2019 09:25   Ordering Phy(s): JC AGUILAR     For improved result formatting, select 'View Enhanced Report Format' under    Linked Documents section.     SPECIMEN(S):   Left courtney thyroid and isthmus     FINAL DIAGNOSIS:   THYROID GLAND, LEFT LOBE AND ISTHMUS, HEMITHYROIDECTOMY AND ISTHMUSECTOMY:   - Benign adenomatoid nodule, 5.2 cm in greatest dimension.   - Biopsy site changes.   - No evidence of malignancy identified.     I have personally reviewed all specimens and/or slides, including the   listed special stains, and used them   with my medical judgement to determine or confirm the final diagnosis.     Electronically signed out by:     Azam Medeiros M.D., [facility]     CLINICAL HISTORY:   Thyroid nodule, previous FNA biopsy benign.     GROSS:   The specimen is received in formalin with proper patient identification,   labeled \"left hemithyroid and   isthmus\".  It consists of a 30.3 g thyroidectomy specimen, consistent of   left hemithyroid which measures 5.2 x   4.1 x 2.5 cm.  The specimen is oriented by the surgeon with a stitch "   denoting posterior lateral margin.     The outer surface is dark-tan, nodular with a firm rubbery consistency.     The anterior capsule is inked blue,   posterior capsule is inked black, isthmus inked orange and area of stitch   is inked yellow.  Upon sectioning,   there is a 5.2 x 4.1 x 2.5 cm irregular, non-encapsulated mass. It has a   variegated white-red-tan color and is   focally cystic and hemorrhagic. It diffusely involves the thyroid and   abuts all margins. No invasion or   calcifications are identified. No normal thyroid parenchyma or parathyroid    glands are identified.   Representative sections are taken as follows.     Summary of sections   A1 Superior pole, perpendicular sections   A2-A7 Mass, sections from superior to inferior   A8 Inferior pole, perpendicular sections     MICROSCOPIC:   Microscopic examination was performed.     The technical component of this testing was completed at the [facility], with the professional component performed    at the [facility], [address] [place] [phone] ([phone])     CPT Codes:   A: 36380-ZC1     COLLECTION SITE:   Client: [facility]   Location: [place])

## 2019-04-22 DIAGNOSIS — E89.0 H/O PARTIAL THYROIDECTOMY: ICD-10-CM

## 2019-04-22 DIAGNOSIS — Z86.39 HISTORY OF HYPERTHYROIDISM: ICD-10-CM

## 2019-04-22 LAB
CALCIUM SERPL-MCNC: 8.4 MG/DL (ref 8.5–10.1)
PHOSPHATE SERPL-MCNC: 2.4 MG/DL (ref 2.5–4.5)
PTH-INTACT SERPL-MCNC: 64 PG/ML (ref 18–80)
T4 FREE SERPL-MCNC: 0.98 NG/DL (ref 0.76–1.46)
TSH SERPL DL<=0.005 MIU/L-ACNC: 0.35 MU/L (ref 0.4–4)

## 2019-04-24 DIAGNOSIS — E89.0 H/O PARTIAL THYROIDECTOMY: Primary | ICD-10-CM

## 2019-04-24 DIAGNOSIS — E83.39 HYPOPHOSPHATEMIA: ICD-10-CM

## 2019-04-24 DIAGNOSIS — E83.51 HYPOCALCEMIA: ICD-10-CM

## 2020-06-23 ENCOUNTER — TELEPHONE (OUTPATIENT)
Dept: ANESTHESIOLOGY | Facility: CLINIC | Age: 50
End: 2020-06-23

## 2020-06-23 NOTE — TELEPHONE ENCOUNTER
[facility] reached out to pt at the request of Dr. Watkins to set up a clinic appointment. Pt agreeable and scheduled for appointment 6/29/20 at 1500.     Zuleyma Clinton RN, BSN

## 2020-06-30 ENCOUNTER — TELEPHONE (OUTPATIENT)
Dept: ANESTHESIOLOGY | Facility: CLINIC | Age: 50
End: 2020-06-30

## 2020-07-01 ENCOUNTER — OFFICE VISIT (OUTPATIENT)
Dept: ANESTHESIOLOGY | Facility: CLINIC | Age: 50
End: 2020-07-01
Payer: COMMERCIAL

## 2020-07-01 VITALS
BODY MASS INDEX: 31.27 KG/M2 | DIASTOLIC BLOOD PRESSURE: 85 MMHG | RESPIRATION RATE: 16 BRPM | HEIGHT: 61 IN | SYSTOLIC BLOOD PRESSURE: 132 MMHG | HEART RATE: 93 BPM

## 2020-07-01 DIAGNOSIS — M53.3 SI (SACROILIAC) JOINT DYSFUNCTION: Primary | ICD-10-CM

## 2020-07-01 RX ORDER — LIDOCAINE 50 MG/G
1 PATCH TOPICAL EVERY 24 HOURS
Qty: 30 PATCH | Refills: 1 | Status: SHIPPED | OUTPATIENT
Start: 2020-07-01

## 2020-07-01 RX ORDER — MELOXICAM 7.5 MG/1
TABLET ORAL
Qty: 60 TABLET | Refills: 1 | Status: SHIPPED | OUTPATIENT
Start: 2020-07-01

## 2020-07-01 ASSESSMENT — PAIN SCALES - GENERAL: PAINLEVEL: MILD PAIN (3)

## 2020-07-01 NOTE — LETTER
7/1/2020       RE: Chayo Chang  [address]  [place] [address]     Dear Colleague,    Thank you for referring your patient, Chayo Chang, to the [facility] FOR COMPREHENSIVE PAIN MANAGEMENT at [facility]. Please see a copy of my visit note below.    AVS sent to pt's Karent.     Hiral Phillips LPN      The patient is a 49 y/o lady with a chief complaint of left buttock and thigh pain for approximately 8 months.  The pain is 8/10 in severity and is accompanied by left groin pain.  She states that the pain is exacerbated by sitting, lying on her left side, and taking long car rides.  The pain is alleviated by ice, NSAIDs, lidoderm and a steroid burst that she had.  She states that the pain is present everyday, and she wakes with the pain on most days.  She has not experienced any trauma.  The pain is tingling and burning.  Her normal routine , including exercise, has been interrupted secondary to the pain.  She has tried PT and ibuprofen with equivocal results.  She has never tried topical diclofenac  Current Outpatient Medications   Medication     acetaminophen (TYLENOL) 325 MG tablet     acetaminophen (TYLENOL) 325 MG tablet     carBAMazepine (TEGRETOL XR) 400 MG 12 hr tablet     ibuprofen (IBUPROFEN) 200 MG tablet     levothyroxine (SYNTHROID/LEVOTHROID) 112 MCG tablet     lidocaine (LIDODERM) 5 % patch     meloxicam (MOBIC) 7.5 MG tablet     PHENobarbital (LUMINAL) 30 MG tablet     propranolol (INDERAL LA) 80 MG capsule     SUMAtriptan (IMITREX) 100 MG tablet     No current facility-administered medications for this visit.      Allergies   Allergen Reactions     Ampicillin      Reacted as a child, unknown     Codeine Nausea and Vomiting     Past Medical History:   Diagnosis Date     Hyperthyroidism 06/2018     Migraines      PONV (postoperative nausea and vomiting)      Seizures (H)      Thyroid nodule 06/2018    reason for appt     Past Surgical  History:   Procedure Laterality Date     INJECT EPIDURAL LUMBAR / SACRAL SINGLE Left 5/31/2018    Procedure: INJECT EPIDURAL LUMBAR / SACRAL CONTINUAL OR INTERMITTENT;  Lumbar Epidural interlaminal epidural steroid injection left L3/4;  Surgeon: Joon Watkins MD;  [facility]     INJECT EPIDURAL TRANSFORAMINAL LUMBAR / SACRAL SINGLE Left 5/24/2018    Procedure: INJECT EPIDURAL TRANSFORAMINAL LUMBAR / SACRAL SINGLE;  Left transforaminal lumbar epidural injection L3L4;  Surgeon: Joon Watkins MD;  [facility]     RELEASE DEQUERVAINS WRIST Left 8/11/2016    Procedure: RELEASE DEQUERVAINS WRIST;  Surgeon: Deisi Lewis MD;  [facility]     SKIN INCISION      nodules removed from scalp in childhood      THYROIDECTOMY Left 3/4/2019    Procedure: Left Hemithyroidectomy And Isthmusectomy;  Surgeon: Otoniel Simon MD;  [facility]     VASCULAR SURGERY       wisdom teeth       4 removed      Family History   Problem Relation Age of Onset     Asthma Other      Osteoporosis Other      Thyroid Disease Other      Hyperthyroidism Mother         graves, 131I      Asthma Mother      Social History     Socioeconomic History     Marital status:      Spouse name: Not on file     Number of children: Not on file     Years of education: Not on file     Highest education level: Not on file   Occupational History     Not on file   Social Needs     Financial resource strain: Not on file     Food insecurity     Worry: Not on file     Inability: Not on file     Transportation needs     Medical: Not on file     Non-medical: Not on file   Tobacco Use     Smoking status: Never Smoker     Smokeless tobacco: Never Used   Substance and Sexual Activity     Alcohol use: Yes     Alcohol/week: 7.0 standard drinks     Comment: occas     Drug use: No     Sexual activity: Not Currently     Partners: Male     Birth control/protection: Diaphragm, Spermicide   Lifestyle     Physical activity     Days per week: Not on  file     Minutes per session: Not on file     Stress: Not on file   Relationships     Social connections     Talks on phone: Not on file     Gets together: Not on file     Attends Rastafari service: Not on file     Active member of club or organization: Not on file     Attends meetings of clubs or organizations: Not on file     Relationship status: Not on file     Intimate partner violence     Fear of current or ex partner: Not on file     Emotionally abused: Not on file     Physically abused: Not on file     Forced sexual activity: Not on file   Other Topics Concern     Parent/sibling w/ CABG, MI or angioplasty before 65F 55M? Not Asked   Social History Narrative     Not on file      ROS: 10 point ROS neg other than the symptoms noted above in the HPI.  Physical Exam  Vitals signs and nursing note reviewed.   Constitutional:       Appearance: Normal appearance.   HENT:      Head: Normocephalic and atraumatic.   Eyes:      Extraocular Movements: Extraocular movements intact.      Conjunctiva/sclera: Conjunctivae normal.      Pupils: Pupils are equal, round, and reactive to light.   Neck:      Musculoskeletal: Normal range of motion and neck supple.   Musculoskeletal:         General: Tenderness present. No swelling, deformity or signs of injury.      Lumbar back: She exhibits decreased range of motion, tenderness and bony tenderness. She exhibits no swelling, no edema, no deformity, no laceration and no pain.      Right lower leg: No edema.      Left lower leg: No edema.      Comments: Patient has positve Yeomans and Gaenslens sign   Neurological:      General: No focal deficit present.      Mental Status: She is alert.   Psychiatric:         Mood and Affect: Mood normal.         Behavior: Behavior normal.     A/P:Patient is a 50 y/i lady with left sided LBP. She has no radicular sx.  Would like to r/o facet arthropathy and/or sacroiliac arthropathy.  Will schedule meloxicam 7.5 mg every day in addition to lidoderm  patches ob=violeta the left IT band.  Patient will f/u in 2 months for re-evaluation.  May consider SI injection on the left      Again, thank you for allowing me to participate in the care of your patient.      Sincerely,    Joon Watkins MD

## 2020-07-01 NOTE — PATIENT INSTRUCTIONS
Medications:    Trial taking Mobic 7.5 mg, two times daily for 2 weeks. Then decrease to 1 tablet daily. Take medication with food.     Trial using Lidocaine 5% patches- apply to the affected area (You can cut them in half to apply to 2 areas.) Patches should be worn for 12 hours an then removed for 12 hours.       Recommended Follow up:  6-8 weeks with Dr. Watkins. Call or contact the clinic if earlier appointment is indicated.    To speak with a nurse, schedule/reschedule/cancel a clinic appointment, or request a medication refill call: [phone]    You can also reach us by SocialProof: [email or web address]

## 2020-07-03 ENCOUNTER — MYC MEDICAL ADVICE (OUTPATIENT)
Dept: ANESTHESIOLOGY | Facility: CLINIC | Age: 50
End: 2020-07-03

## 2020-07-03 ENCOUNTER — TELEPHONE (OUTPATIENT)
Dept: ANESTHESIOLOGY | Facility: CLINIC | Age: 50
End: 2020-07-03

## 2020-07-03 NOTE — TELEPHONE ENCOUNTER
Prior Authorization Retail Medication Request    Medication/Dose: lidocaine (LIDODERM) 5 % patch   ICD code (if different than what is on RX):    Previously Tried and Failed:    Rationale:      Insurance Name:    Insurance ID:       Pharmacy Information (if different than what is on RX)  Name:    Phone:

## 2020-07-03 NOTE — TELEPHONE ENCOUNTER
Central Prior Authorization Team   Phone: [phone]      PA Initiation    Medication: lidocaine (LIDODERM) 5 % patch   Insurance Company: [employer] - Phone [phone] Fax [phone]  Pharmacy Filling the Rx: [id number] - [place], [place] - [address] [place] [address] & [facility]  Filling Pharmacy Phone: [phone]  Filling Pharmacy Fax:    Start Date: 7/3/2020

## 2020-07-03 NOTE — TELEPHONE ENCOUNTER
PRIOR AUTHORIZATION DENIED    Medication: lidocaine (LIDODERM) 5 % patch    Denial Date: 7/3/2020    Denial Rational:         Appeal Information:

## 2020-08-01 NOTE — PROGRESS NOTES
The patient is a 51 y/o lady with a chief complaint of left buttock and thigh pain for approximately 8 months.  The pain is 8/10 in severity and is accompanied by left groin pain.  She states that the pain is exacerbated by sitting, lying on her left side, and taking long car rides.  The pain is alleviated by ice, NSAIDs, lidoderm and a steroid burst that she had.  She states that the pain is present everyday, and she wakes with the pain on most days.  She has not experienced any trauma.  The pain is tingling and burning.  Her normal routine , including exercise, has been interrupted secondary to the pain.  She has tried PT and ibuprofen with equivocal results.  She has never tried topical diclofenac  Current Outpatient Medications   Medication     acetaminophen (TYLENOL) 325 MG tablet     acetaminophen (TYLENOL) 325 MG tablet     carBAMazepine (TEGRETOL XR) 400 MG 12 hr tablet     ibuprofen (IBUPROFEN) 200 MG tablet     levothyroxine (SYNTHROID/LEVOTHROID) 112 MCG tablet     lidocaine (LIDODERM) 5 % patch     meloxicam (MOBIC) 7.5 MG tablet     PHENobarbital (LUMINAL) 30 MG tablet     propranolol (INDERAL LA) 80 MG capsule     SUMAtriptan (IMITREX) 100 MG tablet     No current facility-administered medications for this visit.      Allergies   Allergen Reactions     Ampicillin      Reacted as a child, unknown     Codeine Nausea and Vomiting     Past Medical History:   Diagnosis Date     Hyperthyroidism 06/2018     Migraines      PONV (postoperative nausea and vomiting)      Seizures (H)      Thyroid nodule 06/2018    reason for appt     Past Surgical History:   Procedure Laterality Date     INJECT EPIDURAL LUMBAR / SACRAL SINGLE Left 5/31/2018    Procedure: INJECT EPIDURAL LUMBAR / SACRAL CONTINUAL OR INTERMITTENT;  Lumbar Epidural interlaminal epidural steroid injection left L3/4;  Surgeon: Joon Watkins MD;  [facility]     INJECT EPIDURAL TRANSFORAMINAL LUMBAR / SACRAL SINGLE Left 5/24/2018    Procedure:  INJECT EPIDURAL TRANSFORAMINAL LUMBAR / SACRAL SINGLE;  Left transforaminal lumbar epidural injection L3L4;  Surgeon: Joon Watkins MD;  [facility]     RELEASE DEQUERVAINS WRIST Left 8/11/2016    Procedure: RELEASE DEQUERVAINS WRIST;  Surgeon: Deisi Lewis MD;  [facility]     SKIN INCISION      nodules removed from scalp in childhood      THYROIDECTOMY Left 3/4/2019    Procedure: Left Hemithyroidectomy And Isthmusectomy;  Surgeon: Otoniel Simon MD;  [facility]     VASCULAR SURGERY       wisdom teeth       4 removed      Family History   Problem Relation Age of Onset     Asthma Other      Osteoporosis Other      Thyroid Disease Other      Hyperthyroidism Mother         graves, 131I      Asthma Mother      Social History     Socioeconomic History     Marital status:      Spouse name: Not on file     Number of children: Not on file     Years of education: Not on file     Highest education level: Not on file   Occupational History     Not on file   Social Needs     Financial resource strain: Not on file     Food insecurity     Worry: Not on file     Inability: Not on file     Transportation needs     Medical: Not on file     Non-medical: Not on file   Tobacco Use     Smoking status: Never Smoker     Smokeless tobacco: Never Used   Substance and Sexual Activity     Alcohol use: Yes     Alcohol/week: 7.0 standard drinks     Comment: occas     Drug use: No     Sexual activity: Not Currently     Partners: Male     Birth control/protection: Diaphragm, Spermicide   Lifestyle     Physical activity     Days per week: Not on file     Minutes per session: Not on file     Stress: Not on file   Relationships     Social connections     Talks on phone: Not on file     Gets together: Not on file     Attends Anabaptist service: Not on file     Active member of club or organization: Not on file     Attends meetings of clubs or organizations: Not on file     Relationship status: Not on file      Intimate partner violence     Fear of current or ex partner: Not on file     Emotionally abused: Not on file     Physically abused: Not on file     Forced sexual activity: Not on file   Other Topics Concern     Parent/sibling w/ CABG, MI or angioplasty before 65F 55M? Not Asked   Social History Narrative     Not on file      ROS: 10 point ROS neg other than the symptoms noted above in the HPI.  Physical Exam  Vitals signs and nursing note reviewed.   Constitutional:       Appearance: Normal appearance.   HENT:      Head: Normocephalic and atraumatic.   Eyes:      Extraocular Movements: Extraocular movements intact.      Conjunctiva/sclera: Conjunctivae normal.      Pupils: Pupils are equal, round, and reactive to light.   Neck:      Musculoskeletal: Normal range of motion and neck supple.   Musculoskeletal:         General: Tenderness present. No swelling, deformity or signs of injury.      Lumbar back: She exhibits decreased range of motion, tenderness and bony tenderness. She exhibits no swelling, no edema, no deformity, no laceration and no pain.      Right lower leg: No edema.      Left lower leg: No edema.      Comments: Patient has positve Yeomans and Gaenslens sign   Neurological:      General: No focal deficit present.      Mental Status: She is alert.   Psychiatric:         Mood and Affect: Mood normal.         Behavior: Behavior normal.     A/P:Patient is a 50 y/i lady with left sided LBP. She has no radicular sx.  Would like to r/o facet arthropathy and/or sacroiliac arthropathy.  Will schedule meloxicam 7.5 mg every day in addition to lidoderm patches ob=violeta the left IT band.  Patient will f/u in 2 months for re-evaluation.  May consider SI injection on the left

## 2020-11-08 ENCOUNTER — HEALTH MAINTENANCE LETTER (OUTPATIENT)
Age: 50
End: 2020-11-08

## 2021-09-11 ENCOUNTER — HEALTH MAINTENANCE LETTER (OUTPATIENT)
Age: 51
End: 2021-09-11

## 2022-01-01 ENCOUNTER — HEALTH MAINTENANCE LETTER (OUTPATIENT)
Age: 52
End: 2022-01-01

## 2022-10-30 ENCOUNTER — HEALTH MAINTENANCE LETTER (OUTPATIENT)
Age: 52
End: 2022-10-30

## 2023-01-01 NOTE — TELEPHONE ENCOUNTER
----- Message from Jennifer Multani RN sent at 9/25/2018 11:29 AM CDT -----  Regarding: FW: my open NEN today      ----- Message -----     From: Ashley Brar MD     Sent: 9/25/2018  11:21 AM       To: [facility]  Subject: my open NEN today                                See if she wants to see me at 6 PM today.  IF she is not available then please try to fill it with someone else.    Ashley Brar   2

## 2023-04-08 ENCOUNTER — HEALTH MAINTENANCE LETTER (OUTPATIENT)
Age: 53
End: 2023-04-08

## 2023-11-12 ENCOUNTER — HEALTH MAINTENANCE LETTER (OUTPATIENT)
Age: 53
End: 2023-11-12

## 2024-08-14 DIAGNOSIS — M25.531 WRIST PAIN, RIGHT: Primary | ICD-10-CM

## 2024-08-14 NOTE — TELEPHONE ENCOUNTER
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 Auth per Dr Lewis.    APPOINTMENT DATE: 8/15/24   NOTES STATUS DETAILS   OFFICE NOTE from referring provider Care Everywhere 1/9/24 - Jass Zhang,  - [facility]    MEDICATION LIST Internal    XRAYS (IMAGES & REPORTS) PACS 1/9/24 - XR Wrist right   6/8/23 - XR Wrist right  4/26/23 - XR Wrist right  3/23/23 - XR Wrist right  3/11/23 - XR Wrist right, XR hand right

## 2024-08-15 ENCOUNTER — PRE VISIT (OUTPATIENT)
Dept: ORTHOPEDICS | Facility: CLINIC | Age: 54
End: 2024-08-15

## 2024-08-15 ENCOUNTER — ANCILLARY PROCEDURE (OUTPATIENT)
Dept: GENERAL RADIOLOGY | Facility: CLINIC | Age: 54
End: 2024-08-15
Attending: ORTHOPAEDIC SURGERY
Payer: COMMERCIAL

## 2024-08-15 ENCOUNTER — OFFICE VISIT (OUTPATIENT)
Dept: ORTHOPEDICS | Facility: CLINIC | Age: 54
End: 2024-08-15
Payer: COMMERCIAL

## 2024-08-15 DIAGNOSIS — M65.4 TENOSYNOVITIS, DE QUERVAIN: Primary | ICD-10-CM

## 2024-08-15 DIAGNOSIS — M25.531 WRIST PAIN, RIGHT: ICD-10-CM

## 2024-08-15 PROCEDURE — 99204 OFFICE O/P NEW MOD 45 MIN: CPT | Performed by: ORTHOPAEDIC SURGERY

## 2024-08-15 PROCEDURE — 73110 X-RAY EXAM OF WRIST: CPT | Mod: RT | Performed by: RADIOLOGY

## 2024-08-15 NOTE — NURSING NOTE
Reason For Visit:   Chief Complaint   Patient presents with    Consult     Right wrist tendonitis after non-operative right wrist fracture in March of 2023.        Primary MD: Kervin Reyes  Ref. MD: Mendoza    Age: 54 year old    ?  No      There were no vitals taken for this visit.      Pain Assessment  Patient Currently in Pain: Yes  0-10 Pain Scale: 1  Primary Pain Location: Wrist (Right radial wrist and thumb)  Pain Descriptors: Aching, Sharp, Intermittent    Hand Dominance Evaluation  Hand Dominance: Right      force  R hand pincher force: 6.804 kg (15 lb)  R hand  level 2 force: 22.7 kg (50 lb)  L hand pincher force: 5.443 kg (12 lb)  L hand  level  2 force: 19.1 kg (42 lb)    QuickDASH Assessment      8/14/2024    10:40 AM   QuickDASH Main   1. Open a tight or new jar Mild difficulty   2. Do heavy household chores (e.g., wash walls, floors) Moderate difficulty   3. Carry a shopping bag or briefcase Mild difficulty   4. Wash your back Mild difficulty   5. Use a knife to cut food Mild difficulty   6. Recreational activities in which you take some force or impact through your arm, shoulder or hand (e.g., golf, hammering, tennis, etc.) Moderate difficulty   7. During the past week, to what extent has your arm, shoulder or hand problem interfered with your normal social activities with family, friends, neighbours or groups Slightly   8. During the past week, were you limited in your work or other regular daily activities as a result of your arm, shoulder or hand problem Moderately limited   9. Arm, shoulder or hand pain Moderate   10.Tingling (pins and needles) in your arm,shoulder or hand Mild   11. During the past week, how much difficulty have you had sleeping because of the pain in your arm, shoulder or hand Mild difficulty   Quickdash Ability Score 34.09          Current Outpatient Medications   Medication Sig Dispense Refill    acetaminophen (TYLENOL) 325 MG tablet Take 2 tablets (650  mg) by mouth every 4 hours as needed for other (mild pain) 30 tablet 0    acetaminophen (TYLENOL) 325 MG tablet Take 325-650 mg by mouth every 6 hours as needed      carBAMazepine (TEGRETOL XR) 400 MG 12 hr tablet Take 400 mg by mouth 2 times daily      ibuprofen (IBUPROFEN) 200 MG tablet Take 1 tablet (200 mg) by mouth every 4 hours as needed 100 tablet 0    levothyroxine (SYNTHROID/LEVOTHROID) 112 MCG tablet Take 1 tablet (112 mcg) by mouth daily 90 tablet 3    lidocaine (LIDODERM) 5 % patch Place 1 patch onto the skin every 24 hours To prevent lidocaine toxicity, patient should be patch free for 12 hrs daily. 30 patch 1    meloxicam (MOBIC) 7.5 MG tablet Take 1 tablet by mouth twice daily for 2 weeks, then decrease to 1 tablet daily. Take with food. 60 tablet 1    PHENobarbital (LUMINAL) 30 MG tablet Take 30 mg by mouth 2 times daily      propranolol (INDERAL LA) 80 MG capsule Take by mouth At Bedtime       SUMAtriptan (IMITREX) 100 MG tablet Take by mouth at onset of headache         Allergies   Allergen Reactions    Ampicillin      Reacted as a child, unknown    Codeine Nausea and Vomiting       Birdie Bell, ATC

## 2024-08-15 NOTE — LETTER
8/15/2024      Chayo Chang  [address]  [place] [address]      Dear Colleague,    Thank you for referring your patient, Chayo Chang, to the [facility] [place]. Please see a copy of my visit note below.    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  She has a new problem which is a closed extra-articular fracture of the right distal radius.  She was treated nonoperatively.  Her fracture was a little originally on March, 2024.  She was treated with a cast.  X-rays looked good.  She did extensive occupational therapy.  Her main concern is she is continuing to have problems with her thumb.  She gets swelling during the day.  She has tried a compression sleeve.  The pain radiates up the dorsum of the hand and on the radial side of the thumb.  Sometimes there is tingling and numbness associated with it.  Gets worse as the day goes on.  It limits her ability to use her thumb.    History is obtained from the patient as well as review of outside records from [facility] where she was treated.    Past medical history past surgical history social history allergies medications and review of systems are reviewed.  Of note, she had a left wrist fracture that I had treated in February 2024.  She also had had a deeper veins on the left that I had treated with a surgical release after a single injection.    On examination today Jalyn is a delightful cooperative 54-year-old female.  She has excellent range of motion of the wrist with 80 degrees of flexion and 80 degrees of extension which is nearly symmetrical to her contralateral side.  She has a negative Cottrell shift and a negative impaction test most of her pain is localized to her thumb.  She does have a positive Finkelstein test.  She has a negative grind test.  She has a negative Tinel test.  Capillary refill is brisk.  Sensation is intact to light touch.     force  R  hand pincher force: 6.804 kg (15 lb)  R hand  level 2 force: 22.7 kg (50 lb)  L hand pincher force: 5.443 kg (12 lb)  L hand  level  2 force: 19.1 kg (42 lb)    Present and previous radiographs are reviewed.  There is no evidence of joint space narrowing.  She has healed with a neutral ulnar variance.  There is no evidence of ulnar impaction.  There is no evidence of arthritic changes along the thumb or at the CMC joint.    Impression: Right distal radius fracture with residual swelling and symptoms consistent with Dequervains.    Plan: At this time we will start with nonoperative management.  Orders were written for occupational therapy services for a protective splint and tendon gliding and stretches.  I will see her back and if her symptoms have not resolved we would consider cortisone injection.  I did discuss that surgical release is a possible treatment plan which she knows since she had that on the contralateral side.  Questions were answered best my ability and patient appears to be satisfied with the treatment plan as outlined.    Deisi Lewis MD   Hand and Upper Extremity Specialist  [facility]      Again, thank you for allowing me to participate in the care of your patient.        Sincerely,        Deisi Lewis MD

## 2024-08-19 ENCOUNTER — THERAPY VISIT (OUTPATIENT)
Dept: OCCUPATIONAL THERAPY | Facility: CLINIC | Age: 54
End: 2024-08-19
Payer: COMMERCIAL

## 2024-08-19 DIAGNOSIS — M25.531 RIGHT WRIST PAIN: Primary | ICD-10-CM

## 2024-08-19 DIAGNOSIS — M65.4 TENOSYNOVITIS, DE QUERVAIN: ICD-10-CM

## 2024-08-19 PROCEDURE — 97535 SELF CARE MNGMENT TRAINING: CPT | Mod: GO | Performed by: OCCUPATIONAL THERAPIST

## 2024-08-19 PROCEDURE — 97110 THERAPEUTIC EXERCISES: CPT | Mod: GO | Performed by: OCCUPATIONAL THERAPIST

## 2024-08-19 PROCEDURE — 97165 OT EVAL LOW COMPLEX 30 MIN: CPT | Mod: GO | Performed by: OCCUPATIONAL THERAPIST

## 2024-08-19 PROCEDURE — 97760 ORTHOTIC MGMT&TRAING 1ST ENC: CPT | Mod: GO | Performed by: OCCUPATIONAL THERAPIST

## 2024-08-19 NOTE — PROGRESS NOTES
OCCUPATIONAL THERAPY EVALUATION  Type of Visit: Evaluation       Fall Risk Screen:  Fall screen completed by: OT  Have you fallen 2 or more times in the past year?: No  Have you fallen and had an injury in the past year?: No  Is patient a fall risk?: No    Subjective      Presenting condition or subjective complaint: right hand pain and swelling  Date of onset: 03/23/23    Relevant medical history:     Past Medical History:   Diagnosis Date    Hyperthyroidism 06/2018    Migraines     PONV (postoperative nausea and vomiting)     Seizures (H)     Thyroid nodule 06/2018    reason for appt      Dates & types of surgery: thyroid sugery left hand sugery   Past Surgical History:   Procedure Laterality Date    INJECT EPIDURAL LUMBAR / SACRAL SINGLE Left 5/31/2018    Procedure: INJECT EPIDURAL LUMBAR / SACRAL CONTINUAL OR INTERMITTENT;  Lumbar Epidural interlaminal epidural steroid injection left L3/4;  Surgeon: Joon Watkins MD;  Location: [facility] OR    INJECT EPIDURAL TRANSFORAMINAL LUMBAR / SACRAL SINGLE Left 5/24/2018    Procedure: INJECT EPIDURAL TRANSFORAMINAL LUMBAR / SACRAL SINGLE;  Left transforaminal lumbar epidural injection L3L4;  Surgeon: Joon Watkins MD;  Location: [facility] OR    RELEASE DEQUERVAINS WRIST Left 8/11/2016    Procedure: RELEASE DEQUERVAINS WRIST;  Surgeon: Deisi Lewis MD;  Location: [facility] OR    SKIN INCISION      nodules removed from scalp in childhood     THYROIDECTOMY Left 3/4/2019    Procedure: Left Hemithyroidectomy And Isthmusectomy;  Surgeon: Otoniel Simon MD;  Location: [facility] OR    VASCULAR SURGERY      wisdom teeth       4 removed         Prior diagnostic imaging/testing results: X-ray     Prior therapy history for the same diagnosis, illness or injury:        Prior Level of Function  Transfers: Independent  Ambulation: Independent  ADL: Independent  IADL:  IND    Living Environment  Social support: With a significant other or spouse   Type of home: House   Ramp: No   Stairs inside  the home: Yes 20 Is there a railing: Yes     Help at home: None    Employment: Yes physician; typing hospitalist    Patient goals for therapy: use hand normally    Pain assessment: Pain present     Objective   ADDITIONAL HISTORY:  Right hand dominant  Patient reports symptoms of pain, edema, numbness, and tingling   Transportation: drives  Currently working in normal job without restrictions    Functional Outcome Measure:   Upper Extremity Functional Index Score:  SCORE:   Column Totals: /80: 56   (A lower score indicates greater disability.)     PAIN:  Pain Level at Rest: 0/10  Pain Level with Use: 6/10  Pain Location: wrist and thumb  Pain Quality: Burning, Gnawing, Tender, and Tingling  Pain Frequency: intermittent  Pain is Worst: daytime  Pain is Exacerbated By: typing lifting use of the hand and thumb   Pain is Relieved By: cold, NSAIDs, and OTC thumb spica  Pain Progression: worsened over the past 3 months    EDEMA:   Wrist/Elbow  (Circumference measured in cm) 8/19/2024   Distal Wrist Crease R:16.0 L:15.8   See photo      SENSATION: Decreased Dorsal Radial Sensory Nerve distribution per pt report     ROM:   Wrist ROM  Left AROM Right AROM    Extension 84 74   Flexion 55 50   Radial Deviation (RD) 22 20   Ulnar Deviation (UD) 24 24 +   UD with Th Flex 12 15 ++   Supination 90 65   Pronation 90 60       Thumb ROM  Left AROM Right AROM    MP Joint 46 45   IP Joint  50 35   Radial Abduction 62 41+   Palmar Abduction 74 46   Kapandji Opposition Scale (0-10/10) 10/10 10/10     SPECIAL TESTS:   De Quervain's Special Tests  Pain Report Left Right   Finkelstein's Test - - feels more of a stretch on this side   Radial Nerve Tinel's (DRSN) - -   WHAT Test - ++     NEURAL TENSION TESTING: RNT: Radial Neurodynamic Test (based on ERMIAS Ortez's ULNT)   8/19/2024   0-5 Scale R:3-4 L:5   Position:   0/5: Arm across abdomen in coronal plane  1/5: Depress shoulder, ER to neutral ABD shoulder to 45 degrees  2/5: IR shoulder to end  range, keep elbow at 90 degrees  3/5: Extend elbow to 0 degrees  4/5: Fully pronate forearm  5/5: Flex wrist and fingers with UD  Notes:  (+) indicates beyond grade level but less than long-term to next level  (-) indicates over long-term to level  S1 onset/change of patient's symptoms  S2 definite stop point based on patient's discomfort level    RESISTED TESTING: Resisted Testing (pain report)   Left Right   Long Finger Test  - no pain   APL 5 5   EPB 5 5   EPL 5 5      STRENGTH:     Measured in pounds 8/19/2024 8/19/2024    Left Right   Trial 1 34 46     Lateral Pinch  Measured in pounds 8/19/2024 8/19/2024    Left Right   Trial 1 13 14     3 Point Pinch  Measured in pounds 8/19/2024 8/19/2024    Left Right   Trial 1 12 13   \  PALPATION:   Thumb Palpation    Thumb CMC Joint +   Thenar [place] -   Web Space +   1st Dorsal Compartment ++   Radial Styloid -   FCR Insertion -     Wrist Palpation    1st Dorsal Compartment ++   Distal Radius -   Radial Styloid -   FCR -   Dorsal Sensory Radial Nerve (DSRN) -   DRUJ -   Ulna Styloid -   TFCC -   Volar Scaphoid -   Dorsal Scaphoid -   Volar Lunate -   Dorsal Lunate -       Noted MP HE with pinch, with lateral movement increased mobility and TTP of R MP joint        Assessment & Plan   CLINICAL IMPRESSIONS  Medical Diagnosis: R Wrist Pain    Treatment Diagnosis: R Wrist Pain    Impression/Assessment: Pt is a 54 year old female presenting to Occupational Therapy due to R Wrist pain.  The following significant findings have been identified: Impaired ROM, Impaired sensation, and Pain.  These identified deficits interfere with their ability to perform work tasks, recreational activities, and household chores as compared to previous level of function.     Clinical Decision Making (Complexity):  Assessment of Occupational Performance: 1-3 Performance Deficits  Occupational Performance Limitations: home establishment and management, meal preparation and cleanup, and  work  Clinical Decision Making (Complexity): Low complexity    PLAN OF CARE  Treatment Interventions:  Modalities:  US, Iontophoresis, Fluidotherapy, Paraffin, TENS, and E-Stim  Therapeutic Exercise:  AROM, AAROM, PROM, Tendon Gliding, Blocking, Reverse Blocking, Place and Hold, Contract Relax, Extensor Tracking, Isotonics, Isometrics, and Stabilization  Neuromuscular re-education:  Nerve Gliding, Coordination/Dexterity, Sensory re-education, Desensitization, Kinesthetic Training, Proprioceptive Training, Posture, Kinesiotaping, Strain Counter Strain, Isometrics, and Stabilization  Manual Techniques:  Coordination/Dexterity, Joint mobilization, Scar mobilization, Friction massage, Myofascial release, and Manual edema mobilization  Orthotic Fabrication:  Static, Static progressive, and Dynamic  Self Care:  Self Care Tasks, Ergonomic Considerations, and Work Tasks    Long Term Goals   OT Goal 1  Goal Identifier:   Goal Description: Pt will improve  strength by #5 with 2/10 pain reported  Rationale: In order to maximize safety and independence with performance of self-care activities  Goal Progress: Initial  Target Date: 10/19/24      Frequency of Treatment: 1x a week  Duration of Treatment: 8 weeks     Recommended Referrals to Other Professionals:   Education Assessment: Learner/Method: Patient;Demonstration;Pictures/Video;No Barriers to Learning     Risks and benefits of evaluation/treatment have been explained.   Patient/Family/caregiver agrees with Plan of Care.     Evaluation Time:    OT Eval, Low Complexity Minutes (43739): 25       Signing Clinician: TODD Naylor

## 2024-08-20 NOTE — PROGRESS NOTES
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  She has a new problem which is a closed extra-articular fracture of the right distal radius.  She was treated nonoperatively.  Her fracture was a little originally on March, 2024.  She was treated with a cast.  X-rays looked good.  She did extensive occupational therapy.  Her main concern is she is continuing to have problems with her thumb.  She gets swelling during the day.  She has tried a compression sleeve.  The pain radiates up the dorsum of the hand and on the radial side of the thumb.  Sometimes there is tingling and numbness associated with it.  Gets worse as the day goes on.  It limits her ability to use her thumb.    History is obtained from the patient as well as review of outside records from [facility] where she was treated.    Past medical history past surgical history social history allergies medications and review of systems are reviewed.  Of note, she had a left wrist fracture that I had treated in February 2024.  She also had had a deeper veins on the left that I had treated with a surgical release after a single injection.    On examination today Jalyn is a delightful cooperative 54-year-old female.  She has excellent range of motion of the wrist with 80 degrees of flexion and 80 degrees of extension which is nearly symmetrical to her contralateral side.  She has a negative Cottrell shift and a negative impaction test most of her pain is localized to her thumb.  She does have a positive Finkelstein test.  She has a negative grind test.  She has a negative Tinel test.  Capillary refill is brisk.  Sensation is intact to light touch.     force  R hand pincher force: 6.804 kg (15 lb)  R hand  level 2 force: 22.7 kg (50 lb)  L hand pincher force: 5.443 kg (12 lb)  L hand  level  2 force: 19.1 kg (42 lb)    Present and previous radiographs are reviewed.  There is no evidence of joint space  narrowing.  She has healed with a neutral ulnar variance.  There is no evidence of ulnar impaction.  There is no evidence of arthritic changes along the thumb or at the CMC joint.    Impression: Right distal radius fracture with residual swelling and symptoms consistent with Dequervains.    Plan: At this time we will start with nonoperative management.  Orders were written for occupational therapy services for a protective splint and tendon gliding and stretches.  I will see her back and if her symptoms have not resolved we would consider cortisone injection.  I did discuss that surgical release is a possible treatment plan which she knows since she had that on the contralateral side.  Questions were answered best my ability and patient appears to be satisfied with the treatment plan as outlined.    Deisi Lewis MD   Hand and Upper Extremity Specialist  [facility]

## 2024-08-26 ENCOUNTER — THERAPY VISIT (OUTPATIENT)
Dept: OCCUPATIONAL THERAPY | Facility: CLINIC | Age: 54
End: 2024-08-26
Payer: COMMERCIAL

## 2024-08-26 DIAGNOSIS — M25.531 RIGHT WRIST PAIN: Primary | ICD-10-CM

## 2024-08-26 PROCEDURE — 97110 THERAPEUTIC EXERCISES: CPT | Mod: GO | Performed by: SPECIALIST/TECHNOLOGIST

## 2024-08-26 PROCEDURE — 97112 NEUROMUSCULAR REEDUCATION: CPT | Mod: GO | Performed by: SPECIALIST/TECHNOLOGIST

## 2024-09-04 ENCOUNTER — THERAPY VISIT (OUTPATIENT)
Dept: OCCUPATIONAL THERAPY | Facility: CLINIC | Age: 54
End: 2024-09-04
Payer: COMMERCIAL

## 2024-09-04 DIAGNOSIS — M25.531 RIGHT WRIST PAIN: Primary | ICD-10-CM

## 2024-09-04 PROCEDURE — 97535 SELF CARE MNGMENT TRAINING: CPT | Mod: GO | Performed by: OCCUPATIONAL THERAPIST

## 2024-09-04 PROCEDURE — 97035 APP MDLTY 1+ULTRASOUND EA 15: CPT | Mod: GO | Performed by: OCCUPATIONAL THERAPIST

## 2024-09-11 ENCOUNTER — THERAPY VISIT (OUTPATIENT)
Dept: OCCUPATIONAL THERAPY | Facility: CLINIC | Age: 54
End: 2024-09-11
Payer: COMMERCIAL

## 2024-09-11 DIAGNOSIS — M25.531 RIGHT WRIST PAIN: Primary | ICD-10-CM

## 2024-09-11 PROCEDURE — 97110 THERAPEUTIC EXERCISES: CPT | Mod: GO | Performed by: OCCUPATIONAL THERAPIST

## 2024-09-11 PROCEDURE — 97112 NEUROMUSCULAR REEDUCATION: CPT | Mod: GO | Performed by: OCCUPATIONAL THERAPIST

## 2024-09-25 ENCOUNTER — THERAPY VISIT (OUTPATIENT)
Dept: OCCUPATIONAL THERAPY | Facility: CLINIC | Age: 54
End: 2024-09-25
Payer: COMMERCIAL

## 2024-09-25 DIAGNOSIS — M25.531 RIGHT WRIST PAIN: Primary | ICD-10-CM

## 2024-09-25 PROCEDURE — 97535 SELF CARE MNGMENT TRAINING: CPT | Mod: GO | Performed by: OCCUPATIONAL THERAPIST

## 2024-09-25 NOTE — PROGRESS NOTES
09/25/24 0500   Appointment Info   Treating Provider JAVI Butterfield OTR/L   Total/Authorized Visits 8 POC   Visits Used 5   Medical Diagnosis R Wrist Pain   OT Tx Diagnosis R Wrist Pain   Progress Note/Certification   Onset of Illness/Injury or Date of Surgery 03/23/23   Therapy Frequency 1x a week   Predicted Duration 8 weeks   Progress Note Due Date 10/19/24   Progress Note Completed Date 08/19/24   OT Goal 1   Goal Identifier    Goal Description Pt will improve  strength by #5 with 2/10 pain reported   Rationale In order to maximize safety and independence with performance of self-care activities   Goal Progress Ment   Target Date 10/19/24   Date Met 09/25/24   Subjective Report   Subjective Report Pt pain is okay still having issues with the swelling   Objective Measures   Objective Measures Objective Measure 1;Hand Obj Measures;Objective Measure 2   Hand Objective Measures ROM   ROM Thumb ROM   Objective Measure 1   Objective Measure DWC Edema   Details 16.2   Objective Measure 2   Objective Measure Pain   Details 1/10 max   Thumb ROM    MP Joint 50   IP Joint  54   Radial Abduction 49   Palmar Abduction 46   OT Modalities   OT Modalities Ultrasound    Ultrasound   Treatment Detail R DC 1   Ultrasound -Type (does not include 3-5 min prep/cleanup time) Pulsed 50%;2 cm sound head   Intensity 1.0   Frequency 3 MHz   Patient Response/Progress For edema reduction and pain toelrated well   Treatment Interventions (OT)   Interventions Therapeutic Procedure/Exercise;Self Care/Home Management;Neuromuscular Re-education   Self Care/Home Management   Self-Care/Home Mgmt/ADL, Compensatory, Meal Prep Minutes (62415) 20 Minutes   Self Care Self Care 2   Self Care 1 EDU   Self Care 1 - Details M isotoner globe   Self Care 2 Kinesiotaping for edema management   Self Care 2 - Details Application to distal forearm and radial hand with thin strips to guide edema proximally   Patient Response/Progress Receptive,  demonstrates understanding; well tolerated   Neuromuscular Re-education   Neuro Re-ed 1 Kinesiotaping for edema management   Neuro Re-ed 1 - Details Application to distal forearm and radial hand with thin strips to guide edema proximally   PTRx Neuro Re-ed 1 Radial Nerve Mobility   PTRx Neuro Re-ed 1 - Details 1x10 2x a day, HEP   Skilled Intervention Review of HEP nerve mob with min cues; Kinesiotape education and application   Patient Response/Progress Receptive, demonstrates understanding; well tolerated   Therapeutic Procedure/Exercise   Therapeutic Procedure: strength, endurance, ROM, flexibillity minutes (98306) 5   Therapeutic Procedures Ther Proc 2   Ther Proc 1 Added DQ Phase 3 strengthenin   Ther Proc 2 MLD   Ther Proc 2 - Details for fingers to elbow added to hep   PTRx Ther Proc 1 Wrist Passive Range of Motion DeQuervain's Stretch   PTRx Ther Proc 1 - Details 1x3 20-30 second holds 2x a day, HEP   PTRx Ther Proc 2 Wrist Active Range of Motion Radial and Ulnar Deviation for deQuervains   PTRx Ther Proc 2 - Details 1x15 2x a day, HEP   PTRx Ther Proc 3 EPB active range of motion   PTRx Ther Proc 3 - Details 1x15 2x a day, HEP   PTRx Ther Proc 4 Wrist Active Range of Motion Extension and Flexion Combined   PTRx Ther Proc 4 - Details 15 reps 2x/day, HEP   Skilled Intervention Review of HEP with progression to include R wrist AROM for extension/flexion   Patient Response/Progress Receptive, demos understanding; well tolerated   Therapeutic Activity   PTRx Ther Act 1 Care for Radial Nerve   PTRx Ther Act 1 - Details No Notes   PTRx Ther Act 2 deQuervain Overview   PTRx Ther Act 2 - Details No Notes   Education   Learner/Method Patient;Demonstration;Pictures/Video;No Barriers to Learning   Plan   Home program Upgraded   Updates to plan of care None   Plan for next session D/C   Comments   Comments Edema management KT tape MLD   Total Session Time   Timed Code Treatment Minutes 25   Total Treatment Time (sum of  timed and untimed services) 25           DISCHARGE  Reason for Discharge: Patient has met all goals.    Equipment Issued: None    Discharge Plan: Patient to continue home program.    Referring Provider:  Deisi Lewis

## 2024-10-27 ENCOUNTER — HEALTH MAINTENANCE LETTER (OUTPATIENT)
Age: 54
End: 2024-10-27

## (undated) DEVICE — PREP POVIDONE IODINE SOLUTION 10% 4OZ

## (undated) DEVICE — SU VICRYL 3-0 SH 8X18" UND J864D

## (undated) DEVICE — DRAIN JACKSON PRATT 10MM FLAT 4/4 PERF SU130-1311

## (undated) DEVICE — WIPES FOLEY CARE SURESTEP PROVON DFC100

## (undated) DEVICE — SU SILK 2-0 TIE 12X30" A305H

## (undated) DEVICE — ESU GROUND PAD ADULT W/CORD E7507

## (undated) DEVICE — SPONGE LAP 18X18" X8435

## (undated) DEVICE — DRAIN JACKSON PRATT RESERVOIR 100ML SU130-1305

## (undated) DEVICE — Device

## (undated) DEVICE — GLOVE PROTEXIS POWDER FREE SMT 8.0  2D72PT80X

## (undated) DEVICE — SOL WATER IRRIG 1000ML BOTTLE 2F7114

## (undated) DEVICE — LINEN TOWEL PACK X6 WHITE 5487

## (undated) DEVICE — PREP CHLORAPREP W/ORANGE TINT 10.5ML 260715

## (undated) DEVICE — PREP SKIN SCRUB TRAY 4461A

## (undated) DEVICE — ESU HOLSTER PLASTIC DISP E2400

## (undated) DEVICE — NDL SPINAL 22GA 5" QUINCKE 405148

## (undated) DEVICE — DECANTER VIAL 2006S

## (undated) DEVICE — SUCTION SLEEVE NEPTUNE 2 125MM 0703-005-125

## (undated) DEVICE — PREP POVIDONE IODINE SCRUB 7.5% 4OZ APL82212

## (undated) DEVICE — DRSG TELFA 3"X8" NON25720

## (undated) DEVICE — SU SILK 3-0 TIE 12X30" A304H

## (undated) DEVICE — SU SILK 4-0 TIE 12X30" A303H

## (undated) DEVICE — SU ETHILON 3-0 PS-1 18" 1663H

## (undated) DEVICE — SOL NACL 0.9% IRRIG 1000ML BOTTLE 2F7124

## (undated) DEVICE — PAIN PACK

## (undated) DEVICE — NIM PROBE PRASS INCREMENTING TIP 8225825

## (undated) DEVICE — SPONGE TONSIL W/STRING MED 23275-680

## (undated) DEVICE — SU ETHILON 5-0 P-3 18" BLACK 698G

## (undated) DEVICE — BLADE KNIFE SURG 15 371115

## (undated) DEVICE — CLIP HORIZON SM RED WIDE SLOT 001201

## (undated) DEVICE — LINEN TOWEL PACK X30 5481

## (undated) DEVICE — RETR RING LONE STAR 25X25CM 3310G

## (undated) DEVICE — CATH TRAY FOLEY SURESTEP 16FR W/URNE MTR STLK LATEX A303316A

## (undated) DEVICE — SU SILK 2-0 SH CR 5X18" C0125

## (undated) DEVICE — ESU PENCIL SMOKE EVAC W/ROCKER SWITCH 0703-047-000

## (undated) DEVICE — PACK NEURO MINOR UMMC SNE32MNMU4

## (undated) DEVICE — TUBING IV EXT SET MICRO VOLUME MALE LL ADAPTER 36" 2N3345

## (undated) DEVICE — GLOVE PROTEXIS POWDER FREE SMT 7.5  2D72PT75X

## (undated) DEVICE — CLIP HORIZON MED BLUE 002200

## (undated) DEVICE — NDL EPIDURAL TUOHY 20GA 3.5" 405028

## (undated) DEVICE — SUCTION MANIFOLD DORNOCH ULTRA CART UL-CL500

## (undated) DEVICE — ESU ELEC BLADE 2.75" COATED/INSULATED E1455

## (undated) DEVICE — SPONGE KITTNER 30-101

## (undated) DEVICE — BLADE KNIFE SURG 10 371110

## (undated) DEVICE — SU SILK 0 TIE 6X30" A306H

## (undated) DEVICE — RETR ELASTIC STAYS LONE STAR BLUNT DUAL LEAD 3550-1G

## (undated) DEVICE — GLOVE PROTEXIS BLUE W/NEU-THERA 8.0  2D73EB80

## (undated) DEVICE — SYR 10ML PERFIX LL 332152

## (undated) RX ORDER — FENTANYL CITRATE 50 UG/ML
INJECTION, SOLUTION INTRAMUSCULAR; INTRAVENOUS
Status: DISPENSED
Start: 2019-03-04

## (undated) RX ORDER — SCOLOPAMINE TRANSDERMAL SYSTEM 1 MG/1
PATCH, EXTENDED RELEASE TRANSDERMAL
Status: DISPENSED
Start: 2019-03-04

## (undated) RX ORDER — LIDOCAINE HYDROCHLORIDE AND EPINEPHRINE 10; 10 MG/ML; UG/ML
INJECTION, SOLUTION INFILTRATION; PERINEURAL
Status: DISPENSED
Start: 2019-03-04

## (undated) RX ORDER — HYDROMORPHONE HYDROCHLORIDE 1 MG/ML
INJECTION, SOLUTION INTRAMUSCULAR; INTRAVENOUS; SUBCUTANEOUS
Status: DISPENSED
Start: 2019-03-04

## (undated) RX ORDER — ESMOLOL HYDROCHLORIDE 10 MG/ML
INJECTION INTRAVENOUS
Status: DISPENSED
Start: 2019-03-04

## (undated) RX ORDER — ACETAMINOPHEN 325 MG/1
TABLET ORAL
Status: DISPENSED
Start: 2019-03-04

## (undated) RX ORDER — GLYCOPYRROLATE 0.2 MG/ML
INJECTION, SOLUTION INTRAMUSCULAR; INTRAVENOUS
Status: DISPENSED
Start: 2019-03-04

## (undated) RX ORDER — CLINDAMYCIN PHOSPHATE 600 MG/50ML
INJECTION, SOLUTION INTRAVENOUS
Status: DISPENSED
Start: 2019-03-04

## (undated) RX ORDER — PHENYLEPHRINE HCL IN 0.9% NACL 1 MG/10 ML
SYRINGE (ML) INTRAVENOUS
Status: DISPENSED
Start: 2019-03-04

## (undated) RX ORDER — CEFAZOLIN SODIUM 1 G/3ML
INJECTION, POWDER, FOR SOLUTION INTRAMUSCULAR; INTRAVENOUS
Status: DISPENSED
Start: 2019-03-04

## (undated) RX ORDER — LIDOCAINE HYDROCHLORIDE 20 MG/ML
INJECTION, SOLUTION EPIDURAL; INFILTRATION; INTRACAUDAL; PERINEURAL
Status: DISPENSED
Start: 2019-03-04

## (undated) RX ORDER — PROPOFOL 10 MG/ML
INJECTION, EMULSION INTRAVENOUS
Status: DISPENSED
Start: 2019-03-04

## (undated) RX ORDER — OXYCODONE HCL 5 MG/5 ML
SOLUTION, ORAL ORAL
Status: DISPENSED
Start: 2019-03-04

## (undated) RX ORDER — ONDANSETRON 2 MG/ML
INJECTION INTRAMUSCULAR; INTRAVENOUS
Status: DISPENSED
Start: 2019-03-04

## (undated) RX ORDER — EPHEDRINE SULFATE 50 MG/ML
INJECTION, SOLUTION INTRAMUSCULAR; INTRAVENOUS; SUBCUTANEOUS
Status: DISPENSED
Start: 2019-03-04